# Patient Record
Sex: MALE | Race: WHITE | URBAN - NONMETROPOLITAN AREA
[De-identification: names, ages, dates, MRNs, and addresses within clinical notes are randomized per-mention and may not be internally consistent; named-entity substitution may affect disease eponyms.]

---

## 2017-01-09 ENCOUNTER — DOCTOR'S OFFICE (OUTPATIENT)
Dept: URBAN - NONMETROPOLITAN AREA CLINIC 1 | Facility: CLINIC | Age: 62
Setting detail: OPHTHALMOLOGY
End: 2017-01-09
Payer: MEDICARE

## 2017-01-09 DIAGNOSIS — H25.13: ICD-10-CM

## 2017-01-09 DIAGNOSIS — H16.223: ICD-10-CM

## 2017-01-09 DIAGNOSIS — H34.8310: ICD-10-CM

## 2017-01-09 PROCEDURE — 92134 CPTRZ OPH DX IMG PST SGM RTA: CPT | Performed by: OPHTHALMOLOGY

## 2017-01-09 PROCEDURE — 92014 COMPRE OPH EXAM EST PT 1/>: CPT | Performed by: OPHTHALMOLOGY

## 2017-01-09 ASSESSMENT — REFRACTION_MANIFEST
OD_VA1: 20/
OS_VA1: 20/
OS_VA2: 20/
OS_VA1: 20/
OS_VA2: 20/
OS_VA3: 20/
OD_VA2: 20/
OS_VA3: 20/
OU_VA: 20/
OD_VA2: 20/
OS_VA1: 20/
OD_VA3: 20/
OS_VA3: 20/
OU_VA: 20/
OD_VA2: 20/
OD_VA3: 20/
OD_VA3: 20/
OU_VA: 20/
OD_VA1: 20/
OD_VA1: 20/
OS_VA2: 20/

## 2017-01-09 ASSESSMENT — REFRACTION_CURRENTRX
OD_OVR_VA: 20/
OD_OVR_VA: 20/
OS_OVR_VA: 20/
OS_OVR_VA: 20/
OD_OVR_VA: 20/
OS_OVR_VA: 20/

## 2017-01-09 ASSESSMENT — VISUAL ACUITY
OS_BCVA: 20/25+2
OD_BCVA: 20/25+2

## 2017-01-09 ASSESSMENT — TEAR BREAK UP TIME (TBUT)
OS_TBUT: T
OD_TBUT: T

## 2017-01-09 ASSESSMENT — REFRACTION_AUTOREFRACTION
OS_SPHERE: +0.50
OD_AXIS: 100
OD_SPHERE: -0.25
OD_CYLINDER: -0.50
OS_AXIS: 67
OS_CYLINDER: -0.75

## 2017-01-09 ASSESSMENT — CONFRONTATIONAL VISUAL FIELD TEST (CVF)
OD_FINDINGS: FULL
OS_FINDINGS: FULL

## 2017-01-09 ASSESSMENT — LID EXAM ASSESSMENTS
OS_BLEPHARITIS: T
OD_BLEPHARITIS: T

## 2017-01-09 ASSESSMENT — SPHEQUIV_DERIVED
OD_SPHEQUIV: -0.5
OS_SPHEQUIV: 0.125

## 2017-01-16 ENCOUNTER — DOCTOR'S OFFICE (OUTPATIENT)
Dept: URBAN - NONMETROPOLITAN AREA CLINIC 1 | Facility: CLINIC | Age: 62
Setting detail: OPHTHALMOLOGY
End: 2017-01-16
Payer: MEDICARE

## 2017-01-16 DIAGNOSIS — H34.8310: ICD-10-CM

## 2017-01-16 PROCEDURE — 67028 INJECTION EYE DRUG: CPT | Performed by: OPHTHALMOLOGY

## 2017-01-16 ASSESSMENT — REFRACTION_AUTOREFRACTION
OS_SPHERE: +0.50
OS_CYLINDER: -0.75
OD_AXIS: 100
OD_SPHERE: -0.25
OD_CYLINDER: -0.50
OS_AXIS: 67

## 2017-01-16 ASSESSMENT — SPHEQUIV_DERIVED
OS_SPHEQUIV: 0.125
OD_SPHEQUIV: -0.5

## 2017-01-16 ASSESSMENT — VISUAL ACUITY
OS_BCVA: 20/25+2
OD_BCVA: 20/25+2

## 2017-01-23 ENCOUNTER — DOCTOR'S OFFICE (OUTPATIENT)
Dept: URBAN - NONMETROPOLITAN AREA CLINIC 1 | Facility: CLINIC | Age: 62
Setting detail: OPHTHALMOLOGY
End: 2017-01-23
Payer: MEDICARE

## 2017-01-23 DIAGNOSIS — H34.8310: ICD-10-CM

## 2017-01-23 PROCEDURE — 67210 TREATMENT OF RETINAL LESION: CPT | Performed by: OPHTHALMOLOGY

## 2017-01-23 ASSESSMENT — REFRACTION_AUTOREFRACTION
OS_CYLINDER: -0.75
OS_SPHERE: +0.50
OS_AXIS: 67
OD_AXIS: 100
OD_SPHERE: -0.25
OD_CYLINDER: -0.50

## 2017-01-23 ASSESSMENT — VISUAL ACUITY
OS_BCVA: 20/25+2
OD_BCVA: 20/25+2

## 2017-01-23 ASSESSMENT — SPHEQUIV_DERIVED
OS_SPHEQUIV: 0.125
OD_SPHEQUIV: -0.5

## 2017-02-20 ENCOUNTER — DOCTOR'S OFFICE (OUTPATIENT)
Dept: URBAN - NONMETROPOLITAN AREA CLINIC 1 | Facility: CLINIC | Age: 62
Setting detail: OPHTHALMOLOGY
End: 2017-02-20
Payer: MEDICARE

## 2017-02-20 DIAGNOSIS — H25.13: ICD-10-CM

## 2017-02-20 DIAGNOSIS — H34.8310: ICD-10-CM

## 2017-02-20 DIAGNOSIS — H16.223: ICD-10-CM

## 2017-02-20 PROCEDURE — 92134 CPTRZ OPH DX IMG PST SGM RTA: CPT | Performed by: OPHTHALMOLOGY

## 2017-02-20 PROCEDURE — 92235 FLUORESCEIN ANGRPH MLTIFRAME: CPT | Performed by: OPHTHALMOLOGY

## 2017-02-20 PROCEDURE — 99024 POSTOP FOLLOW-UP VISIT: CPT | Performed by: OPHTHALMOLOGY

## 2017-02-20 PROCEDURE — 92250 FUNDUS PHOTOGRAPHY W/I&R: CPT | Performed by: OPHTHALMOLOGY

## 2017-02-20 ASSESSMENT — REFRACTION_MANIFEST
OU_VA: 20/
OS_VA1: 20/
OU_VA: 20/
OD_VA1: 20/
OU_VA: 20/
OS_VA3: 20/
OS_VA2: 20/
OS_VA1: 20/
OD_VA2: 20/
OD_VA1: 20/
OD_VA2: 20/
OS_VA2: 20/
OD_VA1: 20/
OS_VA2: 20/
OD_VA2: 20/
OS_VA1: 20/
OD_VA3: 20/

## 2017-02-20 ASSESSMENT — TEAR BREAK UP TIME (TBUT)
OD_TBUT: T
OS_TBUT: T

## 2017-02-20 ASSESSMENT — REFRACTION_CURRENTRX
OD_OVR_VA: 20/
OS_OVR_VA: 20/
OS_OVR_VA: 20/
OD_OVR_VA: 20/
OS_OVR_VA: 20/
OD_OVR_VA: 20/

## 2017-02-20 ASSESSMENT — SPHEQUIV_DERIVED
OD_SPHEQUIV: -0.5
OS_SPHEQUIV: 0.125

## 2017-02-20 ASSESSMENT — LID EXAM ASSESSMENTS
OD_BLEPHARITIS: T
OS_BLEPHARITIS: T

## 2017-02-20 ASSESSMENT — REFRACTION_AUTOREFRACTION
OD_AXIS: 100
OD_SPHERE: -0.25
OS_SPHERE: +0.50
OD_CYLINDER: -0.50
OS_CYLINDER: -0.75
OS_AXIS: 67

## 2017-02-20 ASSESSMENT — VISUAL ACUITY
OD_BCVA: 20/20-1
OS_BCVA: 20/20-2

## 2017-02-20 ASSESSMENT — CONFRONTATIONAL VISUAL FIELD TEST (CVF)
OD_FINDINGS: FULL
OS_FINDINGS: FULL

## 2017-03-27 ENCOUNTER — DOCTOR'S OFFICE (OUTPATIENT)
Dept: URBAN - NONMETROPOLITAN AREA CLINIC 1 | Facility: CLINIC | Age: 62
Setting detail: OPHTHALMOLOGY
End: 2017-03-27
Payer: MEDICARE

## 2017-03-27 DIAGNOSIS — H16.223: ICD-10-CM

## 2017-03-27 DIAGNOSIS — H25.13: ICD-10-CM

## 2017-03-27 DIAGNOSIS — H34.8310: ICD-10-CM

## 2017-03-27 PROCEDURE — 92134 CPTRZ OPH DX IMG PST SGM RTA: CPT | Performed by: OPHTHALMOLOGY

## 2017-03-27 PROCEDURE — 99024 POSTOP FOLLOW-UP VISIT: CPT | Performed by: OPHTHALMOLOGY

## 2017-03-27 ASSESSMENT — REFRACTION_MANIFEST
OD_VA2: 20/
OD_VA3: 20/
OS_VA1: 20/
OD_VA3: 20/
OS_VA3: 20/
OS_VA2: 20/
OS_VA2: 20/
OD_VA1: 20/
OU_VA: 20/
OD_VA2: 20/
OS_VA1: 20/
OD_VA1: 20/
OS_VA3: 20/
OD_VA1: 20/
OS_VA3: 20/
OS_VA2: 20/
OS_VA1: 20/
OU_VA: 20/
OD_VA2: 20/
OU_VA: 20/
OD_VA3: 20/

## 2017-03-27 ASSESSMENT — REFRACTION_AUTOREFRACTION
OD_AXIS: 100
OD_SPHERE: -0.25
OS_SPHERE: +0.50
OS_CYLINDER: -0.75
OD_CYLINDER: -0.50
OS_AXIS: 67

## 2017-03-27 ASSESSMENT — SPHEQUIV_DERIVED
OS_SPHEQUIV: 0.125
OD_SPHEQUIV: -0.5

## 2017-03-27 ASSESSMENT — CONFRONTATIONAL VISUAL FIELD TEST (CVF)
OS_FINDINGS: FULL
OD_FINDINGS: FULL

## 2017-03-27 ASSESSMENT — LID EXAM ASSESSMENTS
OD_BLEPHARITIS: T
OS_BLEPHARITIS: T

## 2017-03-27 ASSESSMENT — REFRACTION_CURRENTRX
OD_OVR_VA: 20/
OS_OVR_VA: 20/
OD_OVR_VA: 20/
OS_OVR_VA: 20/
OS_OVR_VA: 20/
OD_OVR_VA: 20/

## 2017-03-27 ASSESSMENT — VISUAL ACUITY
OD_BCVA: 20/20
OS_BCVA: 20/20

## 2017-03-27 ASSESSMENT — TEAR BREAK UP TIME (TBUT)
OS_TBUT: T
OD_TBUT: T

## 2017-04-24 ENCOUNTER — DOCTOR'S OFFICE (OUTPATIENT)
Dept: URBAN - NONMETROPOLITAN AREA CLINIC 1 | Facility: CLINIC | Age: 62
Setting detail: OPHTHALMOLOGY
End: 2017-04-24
Payer: MEDICARE

## 2017-04-24 DIAGNOSIS — H43.811: ICD-10-CM

## 2017-04-24 DIAGNOSIS — H34.8310: ICD-10-CM

## 2017-04-24 DIAGNOSIS — H43.812: ICD-10-CM

## 2017-04-24 DIAGNOSIS — H25.13: ICD-10-CM

## 2017-04-24 DIAGNOSIS — H16.223: ICD-10-CM

## 2017-04-24 PROCEDURE — 92226 OPHTHALMOSCOPY EXT SUBSEQUENT: CPT | Performed by: OPHTHALMOLOGY

## 2017-04-24 PROCEDURE — 92014 COMPRE OPH EXAM EST PT 1/>: CPT | Performed by: OPHTHALMOLOGY

## 2017-04-24 PROCEDURE — 92134 CPTRZ OPH DX IMG PST SGM RTA: CPT | Performed by: OPHTHALMOLOGY

## 2017-04-24 ASSESSMENT — REFRACTION_AUTOREFRACTION
OS_CYLINDER: -0.75
OS_SPHERE: +0.50
OS_AXIS: 67
OD_CYLINDER: -0.50
OD_AXIS: 100
OD_SPHERE: -0.25

## 2017-04-24 ASSESSMENT — REFRACTION_CURRENTRX
OS_OVR_VA: 20/
OD_OVR_VA: 20/
OD_OVR_VA: 20/
OS_OVR_VA: 20/
OD_OVR_VA: 20/
OS_OVR_VA: 20/

## 2017-04-24 ASSESSMENT — SPHEQUIV_DERIVED
OS_SPHEQUIV: 0.125
OD_SPHEQUIV: -0.5

## 2017-04-24 ASSESSMENT — REFRACTION_MANIFEST
OS_VA3: 20/
OU_VA: 20/
OU_VA: 20/
OD_VA1: 20/
OD_VA2: 20/
OS_VA2: 20/
OS_VA2: 20/
OS_VA3: 20/
OD_VA2: 20/
OD_VA2: 20/
OD_VA3: 20/
OS_VA2: 20/
OD_VA1: 20/
OD_VA1: 20/
OS_VA1: 20/
OS_VA1: 20/
OS_VA3: 20/
OD_VA3: 20/
OS_VA1: 20/
OU_VA: 20/
OD_VA3: 20/

## 2017-04-24 ASSESSMENT — TEAR BREAK UP TIME (TBUT)
OD_TBUT: T
OS_TBUT: T

## 2017-04-24 ASSESSMENT — LID EXAM ASSESSMENTS
OD_BLEPHARITIS: T
OS_BLEPHARITIS: T

## 2017-04-24 ASSESSMENT — CONFRONTATIONAL VISUAL FIELD TEST (CVF)
OD_FINDINGS: FULL
OS_FINDINGS: FULL

## 2017-04-24 ASSESSMENT — VISUAL ACUITY
OS_BCVA: 20/20-1
OD_BCVA: 20/20-1

## 2017-05-15 ENCOUNTER — DOCTOR'S OFFICE (OUTPATIENT)
Dept: URBAN - NONMETROPOLITAN AREA CLINIC 1 | Facility: CLINIC | Age: 62
Setting detail: OPHTHALMOLOGY
End: 2017-05-15
Payer: MEDICARE

## 2017-05-15 DIAGNOSIS — H34.8310: ICD-10-CM

## 2017-05-15 PROCEDURE — 67028 INJECTION EYE DRUG: CPT | Performed by: OPHTHALMOLOGY

## 2017-05-15 ASSESSMENT — REFRACTION_AUTOREFRACTION
OD_CYLINDER: -0.50
OS_AXIS: 67
OS_SPHERE: +0.50
OD_SPHERE: -0.25
OS_CYLINDER: -0.75
OD_AXIS: 100

## 2017-05-15 ASSESSMENT — VISUAL ACUITY
OD_BCVA: 20/20-1
OS_BCVA: 20/20-1

## 2017-05-15 ASSESSMENT — SPHEQUIV_DERIVED
OD_SPHEQUIV: -0.5
OS_SPHEQUIV: 0.125

## 2017-06-19 ENCOUNTER — DOCTOR'S OFFICE (OUTPATIENT)
Dept: URBAN - NONMETROPOLITAN AREA CLINIC 1 | Facility: CLINIC | Age: 62
Setting detail: OPHTHALMOLOGY
End: 2017-06-19
Payer: MEDICARE

## 2017-06-19 DIAGNOSIS — H25.13: ICD-10-CM

## 2017-06-19 DIAGNOSIS — H34.8310: ICD-10-CM

## 2017-06-19 DIAGNOSIS — H16.223: ICD-10-CM

## 2017-06-19 DIAGNOSIS — H43.812: ICD-10-CM

## 2017-06-19 DIAGNOSIS — H43.811: ICD-10-CM

## 2017-06-19 PROCEDURE — 92134 CPTRZ OPH DX IMG PST SGM RTA: CPT | Performed by: OPHTHALMOLOGY

## 2017-06-19 PROCEDURE — 92014 COMPRE OPH EXAM EST PT 1/>: CPT | Performed by: OPHTHALMOLOGY

## 2017-06-19 PROCEDURE — 92226 OPHTHALMOSCOPY EXT SUBSEQUENT: CPT | Performed by: OPHTHALMOLOGY

## 2017-06-19 ASSESSMENT — REFRACTION_CURRENTRX
OD_OVR_VA: 20/
OS_OVR_VA: 20/

## 2017-06-19 ASSESSMENT — REFRACTION_AUTOREFRACTION
OD_AXIS: 100
OD_CYLINDER: -0.50
OS_SPHERE: +0.50
OS_AXIS: 67
OS_CYLINDER: -0.75
OD_SPHERE: -0.25

## 2017-06-19 ASSESSMENT — REFRACTION_MANIFEST
OS_VA3: 20/
OD_VA3: 20/
OU_VA: 20/
OS_VA2: 20/
OD_VA2: 20/
OS_VA3: 20/
OS_VA3: 20/
OD_VA1: 20/
OD_VA3: 20/
OD_VA1: 20/
OS_VA1: 20/
OU_VA: 20/
OD_VA3: 20/
OU_VA: 20/
OD_VA1: 20/
OD_VA2: 20/
OS_VA2: 20/
OS_VA2: 20/
OD_VA2: 20/
OS_VA1: 20/
OS_VA1: 20/

## 2017-06-19 ASSESSMENT — CONFRONTATIONAL VISUAL FIELD TEST (CVF)
OS_FINDINGS: FULL
OD_FINDINGS: FULL

## 2017-06-19 ASSESSMENT — TEAR BREAK UP TIME (TBUT)
OS_TBUT: T
OD_TBUT: T

## 2017-06-19 ASSESSMENT — LID EXAM ASSESSMENTS
OS_BLEPHARITIS: T
OD_BLEPHARITIS: T

## 2017-06-19 ASSESSMENT — VISUAL ACUITY
OS_BCVA: 20/20-2
OD_BCVA: 20/20-1

## 2017-06-19 ASSESSMENT — SPHEQUIV_DERIVED
OS_SPHEQUIV: 0.125
OD_SPHEQUIV: -0.5

## 2017-07-24 ENCOUNTER — DOCTOR'S OFFICE (OUTPATIENT)
Dept: URBAN - NONMETROPOLITAN AREA CLINIC 1 | Facility: CLINIC | Age: 62
Setting detail: OPHTHALMOLOGY
End: 2017-07-24

## 2017-07-24 ENCOUNTER — DOCTOR'S OFFICE (OUTPATIENT)
Dept: URBAN - NONMETROPOLITAN AREA CLINIC 1 | Facility: CLINIC | Age: 62
Setting detail: OPHTHALMOLOGY
End: 2017-07-24
Payer: MEDICARE

## 2017-07-24 ENCOUNTER — RX ONLY (RX ONLY)
Age: 62
End: 2017-07-24

## 2017-07-24 DIAGNOSIS — H25.13: ICD-10-CM

## 2017-07-24 DIAGNOSIS — H34.8310: ICD-10-CM

## 2017-07-24 DIAGNOSIS — H16.223: ICD-10-CM

## 2017-07-24 DIAGNOSIS — H43.811: ICD-10-CM

## 2017-07-24 DIAGNOSIS — H52.4: ICD-10-CM

## 2017-07-24 DIAGNOSIS — H43.812: ICD-10-CM

## 2017-07-24 PROCEDURE — 92014 COMPRE OPH EXAM EST PT 1/>: CPT | Performed by: OPHTHALMOLOGY

## 2017-07-24 PROCEDURE — 92134 CPTRZ OPH DX IMG PST SGM RTA: CPT | Performed by: OPHTHALMOLOGY

## 2017-07-24 PROCEDURE — VITAEYE VITA EYE VITAMINS: Performed by: OPHTHALMOLOGY

## 2017-07-24 ASSESSMENT — VISUAL ACUITY
OS_BCVA: 20/20
OD_BCVA: 20/20

## 2017-07-24 ASSESSMENT — REFRACTION_AUTOREFRACTION
OS_CYLINDER: -0.75
OD_AXIS: 100
OD_SPHERE: -0.25
OS_AXIS: 67
OS_SPHERE: +0.50
OD_CYLINDER: -0.50

## 2017-07-24 ASSESSMENT — LID EXAM ASSESSMENTS
OD_BLEPHARITIS: T
OS_BLEPHARITIS: T

## 2017-07-24 ASSESSMENT — REFRACTION_CURRENTRX
OS_OVR_VA: 20/
OD_OVR_VA: 20/
OS_OVR_VA: 20/
OS_OVR_VA: 20/

## 2017-07-24 ASSESSMENT — REFRACTION_MANIFEST
OS_VA2: 20/
OS_VA1: 20/
OD_VA2: 20/
OD_VA3: 20/
OD_VA1: 20/
OD_VA2: 20/
OU_VA: 20/
OS_VA2: 20/
OS_VA1: 20/
OS_VA2: 20/
OS_VA3: 20/
OS_VA3: 20/
OD_VA1: 20/
OS_VA3: 20/
OD_VA1: 20/
OD_VA2: 20/
OU_VA: 20/
OD_VA3: 20/
OS_VA1: 20/
OU_VA: 20/
OD_VA3: 20/

## 2017-07-24 ASSESSMENT — SPHEQUIV_DERIVED
OD_SPHEQUIV: -0.5
OS_SPHEQUIV: 0.125

## 2017-07-24 ASSESSMENT — TEAR BREAK UP TIME (TBUT)
OD_TBUT: T
OS_TBUT: T

## 2017-07-24 ASSESSMENT — CONFRONTATIONAL VISUAL FIELD TEST (CVF)
OS_FINDINGS: FULL
OD_FINDINGS: FULL

## 2017-07-31 ENCOUNTER — DOCTOR'S OFFICE (OUTPATIENT)
Dept: URBAN - NONMETROPOLITAN AREA CLINIC 1 | Facility: CLINIC | Age: 62
Setting detail: OPHTHALMOLOGY
End: 2017-07-31
Payer: MEDICARE

## 2017-07-31 DIAGNOSIS — H43.813: ICD-10-CM

## 2017-07-31 DIAGNOSIS — H25.13: ICD-10-CM

## 2017-07-31 DIAGNOSIS — H34.8310: ICD-10-CM

## 2017-07-31 DIAGNOSIS — H16.223: ICD-10-CM

## 2017-07-31 PROCEDURE — 67028 INJECTION EYE DRUG: CPT | Performed by: OPHTHALMOLOGY

## 2017-07-31 PROCEDURE — 92235 FLUORESCEIN ANGRPH MLTIFRAME: CPT | Performed by: OPHTHALMOLOGY

## 2017-07-31 PROCEDURE — 92250 FUNDUS PHOTOGRAPHY W/I&R: CPT | Performed by: OPHTHALMOLOGY

## 2017-07-31 PROCEDURE — 92014 COMPRE OPH EXAM EST PT 1/>: CPT | Performed by: OPHTHALMOLOGY

## 2017-07-31 ASSESSMENT — REFRACTION_MANIFEST
OD_VA3: 20/
OD_VA2: 20/
OU_VA: 20/
OD_VA1: 20/
OS_VA1: 20/
OS_VA3: 20/
OS_VA1: 20/
OS_VA3: 20/
OS_VA2: 20/
OD_VA1: 20/
OS_VA2: 20/
OD_VA3: 20/
OU_VA: 20/
OU_VA: 20/
OS_VA1: 20/
OD_VA2: 20/
OS_VA2: 20/
OD_VA3: 20/
OD_VA1: 20/
OS_VA3: 20/
OD_VA2: 20/

## 2017-07-31 ASSESSMENT — REFRACTION_AUTOREFRACTION
OD_CYLINDER: -0.50
OS_CYLINDER: -0.75
OD_SPHERE: -0.25
OS_AXIS: 67
OS_SPHERE: +0.50
OD_AXIS: 100

## 2017-07-31 ASSESSMENT — REFRACTION_CURRENTRX
OS_OVR_VA: 20/
OS_OVR_VA: 20/
OD_OVR_VA: 20/
OS_OVR_VA: 20/

## 2017-07-31 ASSESSMENT — CONFRONTATIONAL VISUAL FIELD TEST (CVF)
OS_FINDINGS: FULL
OD_FINDINGS: FULL

## 2017-07-31 ASSESSMENT — VISUAL ACUITY
OS_BCVA: 20/25
OD_BCVA: 20/20

## 2017-07-31 ASSESSMENT — TEAR BREAK UP TIME (TBUT)
OS_TBUT: T
OD_TBUT: T

## 2017-07-31 ASSESSMENT — LID EXAM ASSESSMENTS
OD_BLEPHARITIS: T
OS_BLEPHARITIS: T

## 2017-07-31 ASSESSMENT — SPHEQUIV_DERIVED
OD_SPHEQUIV: -0.5
OS_SPHEQUIV: 0.125

## 2017-09-11 ENCOUNTER — DOCTOR'S OFFICE (OUTPATIENT)
Dept: URBAN - NONMETROPOLITAN AREA CLINIC 1 | Facility: CLINIC | Age: 62
Setting detail: OPHTHALMOLOGY
End: 2017-09-11
Payer: MEDICARE

## 2017-09-11 DIAGNOSIS — H43.812: ICD-10-CM

## 2017-09-11 DIAGNOSIS — H43.811: ICD-10-CM

## 2017-09-11 DIAGNOSIS — H16.223: ICD-10-CM

## 2017-09-11 DIAGNOSIS — H43.813: ICD-10-CM

## 2017-09-11 DIAGNOSIS — H34.8310: ICD-10-CM

## 2017-09-11 DIAGNOSIS — H25.13: ICD-10-CM

## 2017-09-11 PROCEDURE — 92226 OPHTHALMOSCOPY EXT SUBSEQUENT: CPT | Performed by: OPHTHALMOLOGY

## 2017-09-11 PROCEDURE — 92014 COMPRE OPH EXAM EST PT 1/>: CPT | Performed by: OPHTHALMOLOGY

## 2017-09-11 PROCEDURE — 92134 CPTRZ OPH DX IMG PST SGM RTA: CPT | Performed by: OPHTHALMOLOGY

## 2017-09-11 ASSESSMENT — REFRACTION_MANIFEST
OU_VA: 20/
OD_VA1: 20/
OS_VA2: 20/
OS_VA1: 20/
OS_VA3: 20/
OS_VA1: 20/
OD_VA1: 20/
OD_VA2: 20/
OD_VA3: 20/
OS_VA1: 20/
OS_VA2: 20/
OD_VA1: 20/
OD_VA3: 20/
OS_VA2: 20/
OD_VA2: 20/
OD_VA3: 20/
OS_VA3: 20/
OD_VA2: 20/
OU_VA: 20/
OU_VA: 20/
OS_VA3: 20/

## 2017-09-11 ASSESSMENT — SPHEQUIV_DERIVED
OD_SPHEQUIV: -0.5
OS_SPHEQUIV: 0.125

## 2017-09-11 ASSESSMENT — REFRACTION_AUTOREFRACTION
OD_AXIS: 100
OS_CYLINDER: -0.75
OD_CYLINDER: -0.50
OS_SPHERE: +0.50
OD_SPHERE: -0.25
OS_AXIS: 67

## 2017-09-11 ASSESSMENT — REFRACTION_CURRENTRX
OS_OVR_VA: 20/
OD_OVR_VA: 20/
OD_OVR_VA: 20/
OS_OVR_VA: 20/
OS_OVR_VA: 20/
OD_OVR_VA: 20/

## 2017-09-11 ASSESSMENT — TEAR BREAK UP TIME (TBUT)
OS_TBUT: T
OD_TBUT: T

## 2017-09-11 ASSESSMENT — CONFRONTATIONAL VISUAL FIELD TEST (CVF)
OD_FINDINGS: FULL
OS_FINDINGS: FULL

## 2017-09-11 ASSESSMENT — LID EXAM ASSESSMENTS
OS_BLEPHARITIS: T
OD_BLEPHARITIS: T

## 2017-09-11 ASSESSMENT — VISUAL ACUITY
OD_BCVA: 20/20-1
OS_BCVA: 20/20

## 2017-10-16 ENCOUNTER — DOCTOR'S OFFICE (OUTPATIENT)
Dept: URBAN - NONMETROPOLITAN AREA CLINIC 1 | Facility: CLINIC | Age: 62
Setting detail: OPHTHALMOLOGY
End: 2017-10-16
Payer: MEDICARE

## 2017-10-16 ENCOUNTER — DOCTOR'S OFFICE (OUTPATIENT)
Dept: URBAN - METROPOLITAN AREA CLINIC 136 | Facility: CLINIC | Age: 62
Setting detail: OPHTHALMOLOGY
End: 2017-10-16

## 2017-10-16 DIAGNOSIS — H43.813: ICD-10-CM

## 2017-10-16 DIAGNOSIS — H34.8310: ICD-10-CM

## 2017-10-16 DIAGNOSIS — H43.812: ICD-10-CM

## 2017-10-16 DIAGNOSIS — H25.13: ICD-10-CM

## 2017-10-16 DIAGNOSIS — H52.4: ICD-10-CM

## 2017-10-16 DIAGNOSIS — H16.223: ICD-10-CM

## 2017-10-16 DIAGNOSIS — H43.811: ICD-10-CM

## 2017-10-16 PROCEDURE — 92134 CPTRZ OPH DX IMG PST SGM RTA: CPT | Performed by: OPHTHALMOLOGY

## 2017-10-16 PROCEDURE — VITAEYE VITA EYE VITAMINS: Performed by: OPHTHALMOLOGY

## 2017-10-16 PROCEDURE — 92014 COMPRE OPH EXAM EST PT 1/>: CPT | Performed by: OPHTHALMOLOGY

## 2017-10-16 PROCEDURE — 92226 OPHTHALMOSCOPY EXT SUBSEQUENT: CPT | Performed by: OPHTHALMOLOGY

## 2017-10-16 ASSESSMENT — REFRACTION_AUTOREFRACTION
OD_SPHERE: -0.25
OS_SPHERE: +0.50
OD_AXIS: 100
OD_CYLINDER: -0.50
OS_CYLINDER: -0.75
OS_AXIS: 67

## 2017-10-16 ASSESSMENT — REFRACTION_MANIFEST
OS_VA1: 20/
OD_VA1: 20/
OU_VA: 20/
OD_VA1: 20/
OS_VA1: 20/
OS_VA2: 20/
OS_VA2: 20/
OD_VA3: 20/
OD_VA3: 20/
OD_VA1: 20/
OU_VA: 20/
OS_VA3: 20/
OS_VA3: 20/
OS_VA2: 20/
OU_VA: 20/
OS_VA1: 20/
OD_VA2: 20/
OS_VA3: 20/
OD_VA2: 20/
OD_VA2: 20/
OD_VA3: 20/

## 2017-10-16 ASSESSMENT — REFRACTION_CURRENTRX
OD_OVR_VA: 20/
OS_OVR_VA: 20/

## 2017-10-16 ASSESSMENT — CONFRONTATIONAL VISUAL FIELD TEST (CVF)
OD_FINDINGS: FULL
OS_FINDINGS: FULL

## 2017-10-16 ASSESSMENT — LID EXAM ASSESSMENTS
OD_BLEPHARITIS: T
OS_BLEPHARITIS: T

## 2017-10-16 ASSESSMENT — SPHEQUIV_DERIVED
OD_SPHEQUIV: -0.5
OS_SPHEQUIV: 0.125

## 2017-10-16 ASSESSMENT — VISUAL ACUITY
OD_BCVA: 20/20-1
OS_BCVA: 20/20-1

## 2017-10-16 ASSESSMENT — TEAR BREAK UP TIME (TBUT)
OD_TBUT: T
OS_TBUT: T

## 2017-11-13 ENCOUNTER — DOCTOR'S OFFICE (OUTPATIENT)
Dept: URBAN - NONMETROPOLITAN AREA CLINIC 1 | Facility: CLINIC | Age: 62
Setting detail: OPHTHALMOLOGY
End: 2017-11-13
Payer: MEDICARE

## 2017-11-13 DIAGNOSIS — H16.223: ICD-10-CM

## 2017-11-13 DIAGNOSIS — H43.813: ICD-10-CM

## 2017-11-13 DIAGNOSIS — H25.13: ICD-10-CM

## 2017-11-13 DIAGNOSIS — H34.8310: ICD-10-CM

## 2017-11-13 PROCEDURE — 67028 INJECTION EYE DRUG: CPT | Performed by: OPHTHALMOLOGY

## 2017-11-13 PROCEDURE — 92250 FUNDUS PHOTOGRAPHY W/I&R: CPT | Performed by: OPHTHALMOLOGY

## 2017-11-13 PROCEDURE — 92235 FLUORESCEIN ANGRPH MLTIFRAME: CPT | Performed by: OPHTHALMOLOGY

## 2017-11-13 ASSESSMENT — REFRACTION_MANIFEST
OS_VA1: 20/
OU_VA: 20/
OS_VA3: 20/
OD_VA1: 20/
OS_VA2: 20/
OS_VA2: 20/
OS_VA3: 20/
OD_VA2: 20/
OS_VA2: 20/
OD_VA3: 20/
OD_VA2: 20/
OS_VA3: 20/
OD_VA1: 20/
OD_VA3: 20/
OS_VA1: 20/
OD_VA2: 20/
OD_VA3: 20/
OD_VA1: 20/
OS_VA1: 20/
OU_VA: 20/
OU_VA: 20/

## 2017-11-13 ASSESSMENT — REFRACTION_CURRENTRX
OS_OVR_VA: 20/
OD_OVR_VA: 20/
OS_OVR_VA: 20/
OD_OVR_VA: 20/
OS_OVR_VA: 20/
OD_OVR_VA: 20/

## 2017-11-13 ASSESSMENT — REFRACTION_AUTOREFRACTION
OD_AXIS: 100
OS_CYLINDER: -0.75
OS_AXIS: 67
OD_CYLINDER: -0.50
OD_SPHERE: -0.25
OS_SPHERE: +0.50

## 2017-11-13 ASSESSMENT — VISUAL ACUITY
OD_BCVA: 20/20-1
OS_BCVA: 20/20-1

## 2017-11-13 ASSESSMENT — CONFRONTATIONAL VISUAL FIELD TEST (CVF)
OS_FINDINGS: FULL
OD_FINDINGS: FULL

## 2017-11-13 ASSESSMENT — SPHEQUIV_DERIVED
OD_SPHEQUIV: -0.5
OS_SPHEQUIV: 0.125

## 2017-12-18 ENCOUNTER — DOCTOR'S OFFICE (OUTPATIENT)
Dept: URBAN - NONMETROPOLITAN AREA CLINIC 1 | Facility: CLINIC | Age: 62
Setting detail: OPHTHALMOLOGY
End: 2017-12-18
Payer: MEDICARE

## 2017-12-18 DIAGNOSIS — H43.813: ICD-10-CM

## 2017-12-18 DIAGNOSIS — H16.223: ICD-10-CM

## 2017-12-18 DIAGNOSIS — H43.811: ICD-10-CM

## 2017-12-18 DIAGNOSIS — H34.8310: ICD-10-CM

## 2017-12-18 DIAGNOSIS — H43.812: ICD-10-CM

## 2017-12-18 DIAGNOSIS — H25.13: ICD-10-CM

## 2017-12-18 PROCEDURE — 92226 OPHTHALMOSCOPY EXT SUBSEQUENT: CPT | Performed by: OPHTHALMOLOGY

## 2017-12-18 PROCEDURE — 92014 COMPRE OPH EXAM EST PT 1/>: CPT | Performed by: OPHTHALMOLOGY

## 2017-12-18 PROCEDURE — 92134 CPTRZ OPH DX IMG PST SGM RTA: CPT | Performed by: OPHTHALMOLOGY

## 2017-12-18 ASSESSMENT — REFRACTION_MANIFEST
OD_VA3: 20/
OD_VA1: 20/
OS_VA1: 20/
OU_VA: 20/
OD_VA3: 20/
OS_VA3: 20/
OD_VA1: 20/
OS_VA2: 20/
OS_VA1: 20/
OD_VA1: 20/
OS_VA1: 20/
OU_VA: 20/
OS_VA2: 20/
OS_VA3: 20/
OD_VA2: 20/
OD_VA3: 20/
OS_VA3: 20/
OU_VA: 20/
OD_VA2: 20/
OD_VA2: 20/
OS_VA2: 20/

## 2017-12-18 ASSESSMENT — REFRACTION_AUTOREFRACTION
OD_AXIS: 100
OD_SPHERE: -0.25
OS_AXIS: 67
OS_SPHERE: +0.50
OD_CYLINDER: -0.50
OS_CYLINDER: -0.75

## 2017-12-18 ASSESSMENT — REFRACTION_CURRENTRX
OD_OVR_VA: 20/
OS_OVR_VA: 20/
OD_OVR_VA: 20/
OS_OVR_VA: 20/
OS_OVR_VA: 20/
OD_OVR_VA: 20/

## 2017-12-18 ASSESSMENT — LID EXAM ASSESSMENTS
OD_BLEPHARITIS: T
OS_BLEPHARITIS: T

## 2017-12-18 ASSESSMENT — SPHEQUIV_DERIVED
OD_SPHEQUIV: -0.5
OS_SPHEQUIV: 0.125

## 2017-12-18 ASSESSMENT — TEAR BREAK UP TIME (TBUT)
OD_TBUT: T
OS_TBUT: T

## 2017-12-18 ASSESSMENT — VISUAL ACUITY
OS_BCVA: 20/20-2
OD_BCVA: 20/20-1

## 2017-12-18 ASSESSMENT — CONFRONTATIONAL VISUAL FIELD TEST (CVF)
OS_FINDINGS: FULL
OD_FINDINGS: FULL

## 2018-01-29 ENCOUNTER — DOCTOR'S OFFICE (OUTPATIENT)
Dept: URBAN - NONMETROPOLITAN AREA CLINIC 1 | Facility: CLINIC | Age: 63
Setting detail: OPHTHALMOLOGY
End: 2018-01-29

## 2018-01-29 ENCOUNTER — RX ONLY (RX ONLY)
Age: 63
End: 2018-01-29

## 2018-01-29 ENCOUNTER — DOCTOR'S OFFICE (OUTPATIENT)
Dept: URBAN - NONMETROPOLITAN AREA CLINIC 1 | Facility: CLINIC | Age: 63
Setting detail: OPHTHALMOLOGY
End: 2018-01-29
Payer: MEDICARE

## 2018-01-29 DIAGNOSIS — H16.223: ICD-10-CM

## 2018-01-29 DIAGNOSIS — H04.123: ICD-10-CM

## 2018-01-29 DIAGNOSIS — H34.8310: ICD-10-CM

## 2018-01-29 DIAGNOSIS — H52.7: ICD-10-CM

## 2018-01-29 DIAGNOSIS — H43.811: ICD-10-CM

## 2018-01-29 DIAGNOSIS — H43.812: ICD-10-CM

## 2018-01-29 DIAGNOSIS — H25.13: ICD-10-CM

## 2018-01-29 PROCEDURE — 92014 COMPRE OPH EXAM EST PT 1/>: CPT | Performed by: OPHTHALMOLOGY

## 2018-01-29 PROCEDURE — 92134 CPTRZ OPH DX IMG PST SGM RTA: CPT | Performed by: OPHTHALMOLOGY

## 2018-01-29 PROCEDURE — 92226 OPHTHALMOSCOPY EXT SUBSEQUENT: CPT | Performed by: OPHTHALMOLOGY

## 2018-01-29 PROCEDURE — 83861 MICROFLUID ANALY TEARS: CPT | Performed by: OPHTHALMOLOGY

## 2018-01-29 PROCEDURE — VITAEYE VITA EYE VITAMINS: Performed by: OPHTHALMOLOGY

## 2018-01-29 ASSESSMENT — REFRACTION_MANIFEST
OS_VA2: 20/
OD_VA2: 20/
OD_VA3: 20/
OS_VA1: 20/
OU_VA: 20/
OD_VA1: 20/
OS_VA1: 20/
OU_VA: 20/
OS_VA2: 20/
OS_VA1: 20/
OD_VA1: 20/
OS_VA2: 20/
OD_VA3: 20/
OD_VA3: 20/
OD_VA2: 20/
OS_VA3: 20/
OD_VA2: 20/
OS_VA3: 20/
OS_VA3: 20/
OU_VA: 20/
OD_VA1: 20/

## 2018-01-29 ASSESSMENT — VISUAL ACUITY
OD_BCVA: 20/20-2
OS_BCVA: 20/20-2

## 2018-01-29 ASSESSMENT — TEAR BREAK UP TIME (TBUT)
OD_TBUT: T
OS_TBUT: T

## 2018-01-29 ASSESSMENT — REFRACTION_AUTOREFRACTION
OD_SPHERE: -0.25
OD_AXIS: 100
OS_AXIS: 67
OS_SPHERE: +0.50
OS_CYLINDER: -0.75
OD_CYLINDER: -0.50

## 2018-01-29 ASSESSMENT — REFRACTION_CURRENTRX
OD_OVR_VA: 20/
OD_OVR_VA: 20/
OS_OVR_VA: 20/
OS_OVR_VA: 20/
OD_OVR_VA: 20/
OS_OVR_VA: 20/

## 2018-01-29 ASSESSMENT — LID EXAM ASSESSMENTS
OS_BLEPHARITIS: T
OD_BLEPHARITIS: T

## 2018-01-29 ASSESSMENT — SPHEQUIV_DERIVED
OD_SPHEQUIV: -0.5
OS_SPHEQUIV: 0.125

## 2018-01-29 ASSESSMENT — CONFRONTATIONAL VISUAL FIELD TEST (CVF)
OS_FINDINGS: FULL
OD_FINDINGS: FULL

## 2018-02-05 ENCOUNTER — DOCTOR'S OFFICE (OUTPATIENT)
Dept: URBAN - NONMETROPOLITAN AREA CLINIC 1 | Facility: CLINIC | Age: 63
Setting detail: OPHTHALMOLOGY
End: 2018-02-05
Payer: MEDICARE

## 2018-02-05 DIAGNOSIS — H34.8310: ICD-10-CM

## 2018-02-05 PROCEDURE — 67028 INJECTION EYE DRUG: CPT | Performed by: OPHTHALMOLOGY

## 2018-02-05 ASSESSMENT — REFRACTION_AUTOREFRACTION
OS_SPHERE: +0.50
OD_AXIS: 100
OS_CYLINDER: -0.75
OD_CYLINDER: -0.50
OD_SPHERE: -0.25
OS_AXIS: 67

## 2018-02-05 ASSESSMENT — VISUAL ACUITY
OS_BCVA: 20/20-2
OD_BCVA: 20/20-2

## 2018-02-05 ASSESSMENT — SPHEQUIV_DERIVED
OS_SPHEQUIV: 0.125
OD_SPHEQUIV: -0.5

## 2018-03-12 ENCOUNTER — DOCTOR'S OFFICE (OUTPATIENT)
Dept: URBAN - NONMETROPOLITAN AREA CLINIC 1 | Facility: CLINIC | Age: 63
Setting detail: OPHTHALMOLOGY
End: 2018-03-12
Payer: MEDICARE

## 2018-03-12 DIAGNOSIS — H25.13: ICD-10-CM

## 2018-03-12 DIAGNOSIS — H16.223: ICD-10-CM

## 2018-03-12 DIAGNOSIS — H34.8310: ICD-10-CM

## 2018-03-12 DIAGNOSIS — H43.812: ICD-10-CM

## 2018-03-12 DIAGNOSIS — H04.123: ICD-10-CM

## 2018-03-12 DIAGNOSIS — H43.811: ICD-10-CM

## 2018-03-12 DIAGNOSIS — H43.813: ICD-10-CM

## 2018-03-12 PROCEDURE — 92134 CPTRZ OPH DX IMG PST SGM RTA: CPT | Performed by: OPHTHALMOLOGY

## 2018-03-12 PROCEDURE — 92014 COMPRE OPH EXAM EST PT 1/>: CPT | Performed by: OPHTHALMOLOGY

## 2018-03-12 PROCEDURE — 92226 OPHTHALMOSCOPY EXT SUBSEQUENT: CPT | Performed by: OPHTHALMOLOGY

## 2018-03-12 ASSESSMENT — REFRACTION_CURRENTRX
OD_OVR_VA: 20/
OS_OVR_VA: 20/

## 2018-03-12 ASSESSMENT — REFRACTION_MANIFEST
OS_VA3: 20/
OS_VA3: 20/
OD_VA2: 20/
OD_VA1: 20/
OD_VA3: 20/
OS_VA3: 20/
OS_VA1: 20/
OU_VA: 20/
OS_VA2: 20/
OU_VA: 20/
OS_VA2: 20/
OS_VA1: 20/
OD_VA2: 20/
OU_VA: 20/
OS_VA1: 20/
OD_VA3: 20/
OD_VA2: 20/
OD_VA1: 20/
OD_VA1: 20/
OS_VA2: 20/
OD_VA3: 20/

## 2018-03-12 ASSESSMENT — VISUAL ACUITY
OS_BCVA: 20/20-2
OD_BCVA: 20/30

## 2018-03-12 ASSESSMENT — LID EXAM ASSESSMENTS
OS_BLEPHARITIS: T
OD_BLEPHARITIS: T

## 2018-03-12 ASSESSMENT — REFRACTION_AUTOREFRACTION
OS_AXIS: 67
OS_SPHERE: +0.50
OD_CYLINDER: -0.50
OS_CYLINDER: -0.75
OD_SPHERE: -0.25
OD_AXIS: 100

## 2018-03-12 ASSESSMENT — CONFRONTATIONAL VISUAL FIELD TEST (CVF)
OS_FINDINGS: FULL
OD_FINDINGS: FULL

## 2018-03-12 ASSESSMENT — SPHEQUIV_DERIVED
OD_SPHEQUIV: -0.5
OS_SPHEQUIV: 0.125

## 2018-03-12 ASSESSMENT — TEAR BREAK UP TIME (TBUT)
OS_TBUT: T
OD_TBUT: T

## 2018-04-02 ENCOUNTER — DOCTOR'S OFFICE (OUTPATIENT)
Dept: URBAN - NONMETROPOLITAN AREA CLINIC 1 | Facility: CLINIC | Age: 63
Setting detail: OPHTHALMOLOGY
End: 2018-04-02
Payer: MEDICARE

## 2018-04-02 ENCOUNTER — RX ONLY (RX ONLY)
Age: 63
End: 2018-04-02

## 2018-04-02 DIAGNOSIS — H34.8310: ICD-10-CM

## 2018-04-02 PROCEDURE — 67028 INJECTION EYE DRUG: CPT | Performed by: OPHTHALMOLOGY

## 2018-04-02 ASSESSMENT — REFRACTION_AUTOREFRACTION
OD_SPHERE: -0.25
OS_CYLINDER: -0.75
OS_AXIS: 67
OD_CYLINDER: -0.50
OS_SPHERE: +0.50
OD_AXIS: 100

## 2018-04-02 ASSESSMENT — VISUAL ACUITY
OS_BCVA: 20/20-2
OD_BCVA: 20/30

## 2018-04-02 ASSESSMENT — SPHEQUIV_DERIVED
OD_SPHEQUIV: -0.5
OS_SPHEQUIV: 0.125

## 2018-04-23 ENCOUNTER — DOCTOR'S OFFICE (OUTPATIENT)
Dept: URBAN - NONMETROPOLITAN AREA CLINIC 1 | Facility: CLINIC | Age: 63
Setting detail: OPHTHALMOLOGY
End: 2018-04-23
Payer: MEDICARE

## 2018-04-23 ENCOUNTER — DOCTOR'S OFFICE (OUTPATIENT)
Dept: URBAN - NONMETROPOLITAN AREA CLINIC 1 | Facility: CLINIC | Age: 63
Setting detail: OPHTHALMOLOGY
End: 2018-04-23

## 2018-04-23 DIAGNOSIS — H52.7: ICD-10-CM

## 2018-04-23 DIAGNOSIS — H34.8310: ICD-10-CM

## 2018-04-23 DIAGNOSIS — H43.811: ICD-10-CM

## 2018-04-23 DIAGNOSIS — H43.812: ICD-10-CM

## 2018-04-23 DIAGNOSIS — H04.123: ICD-10-CM

## 2018-04-23 DIAGNOSIS — H25.13: ICD-10-CM

## 2018-04-23 DIAGNOSIS — H16.223: ICD-10-CM

## 2018-04-23 DIAGNOSIS — H43.813: ICD-10-CM

## 2018-04-23 PROCEDURE — 92014 COMPRE OPH EXAM EST PT 1/>: CPT | Performed by: OPHTHALMOLOGY

## 2018-04-23 PROCEDURE — 92134 CPTRZ OPH DX IMG PST SGM RTA: CPT | Performed by: OPHTHALMOLOGY

## 2018-04-23 PROCEDURE — VITAEYE VITA EYE VITAMINS: Performed by: OPHTHALMOLOGY

## 2018-04-23 PROCEDURE — 92226 OPHTHALMOSCOPY EXT SUBSEQUENT: CPT | Performed by: OPHTHALMOLOGY

## 2018-04-23 ASSESSMENT — SPHEQUIV_DERIVED
OS_SPHEQUIV: 0.125
OD_SPHEQUIV: -0.5

## 2018-04-23 ASSESSMENT — REFRACTION_AUTOREFRACTION
OS_SPHERE: +0.50
OD_CYLINDER: -0.50
OD_SPHERE: -0.25
OD_AXIS: 100
OS_CYLINDER: -0.75
OS_AXIS: 67

## 2018-04-23 ASSESSMENT — LID EXAM ASSESSMENTS
OS_BLEPHARITIS: T
OD_BLEPHARITIS: T

## 2018-04-23 ASSESSMENT — REFRACTION_MANIFEST
OU_VA: 20/
OD_VA2: 20/
OD_VA1: 20/
OS_VA2: 20/
OD_VA3: 20/
OS_VA3: 20/
OD_VA3: 20/
OU_VA: 20/
OU_VA: 20/
OS_VA1: 20/
OS_VA1: 20/
OS_VA2: 20/
OS_VA1: 20/
OS_VA2: 20/
OD_VA3: 20/
OD_VA2: 20/
OD_VA1: 20/
OS_VA3: 20/
OD_VA2: 20/
OS_VA3: 20/
OD_VA1: 20/

## 2018-04-23 ASSESSMENT — REFRACTION_CURRENTRX
OS_OVR_VA: 20/
OD_OVR_VA: 20/
OS_OVR_VA: 20/
OS_OVR_VA: 20/

## 2018-04-23 ASSESSMENT — VISUAL ACUITY
OS_BCVA: 20/25
OD_BCVA: 20/20-2

## 2018-04-23 ASSESSMENT — TEAR BREAK UP TIME (TBUT)
OS_TBUT: T
OD_TBUT: T

## 2018-04-23 ASSESSMENT — CONFRONTATIONAL VISUAL FIELD TEST (CVF)
OD_FINDINGS: FULL
OS_FINDINGS: FULL

## 2018-05-21 ENCOUNTER — DOCTOR'S OFFICE (OUTPATIENT)
Dept: URBAN - NONMETROPOLITAN AREA CLINIC 1 | Facility: CLINIC | Age: 63
Setting detail: OPHTHALMOLOGY
End: 2018-05-21
Payer: MEDICARE

## 2018-05-21 DIAGNOSIS — H43.812: ICD-10-CM

## 2018-05-21 DIAGNOSIS — H16.223: ICD-10-CM

## 2018-05-21 DIAGNOSIS — H43.813: ICD-10-CM

## 2018-05-21 DIAGNOSIS — H43.811: ICD-10-CM

## 2018-05-21 DIAGNOSIS — H25.13: ICD-10-CM

## 2018-05-21 DIAGNOSIS — H34.8310: ICD-10-CM

## 2018-05-21 PROCEDURE — 92014 COMPRE OPH EXAM EST PT 1/>: CPT | Performed by: OPHTHALMOLOGY

## 2018-05-21 PROCEDURE — 92134 CPTRZ OPH DX IMG PST SGM RTA: CPT | Performed by: OPHTHALMOLOGY

## 2018-05-21 PROCEDURE — 92226 OPHTHALMOSCOPY EXT SUBSEQUENT: CPT | Performed by: OPHTHALMOLOGY

## 2018-05-21 ASSESSMENT — REFRACTION_MANIFEST
OD_VA1: 20/
OU_VA: 20/
OD_VA1: 20/
OS_VA2: 20/
OS_VA2: 20/
OU_VA: 20/
OD_VA2: 20/
OU_VA: 20/
OS_VA2: 20/
OS_VA1: 20/
OD_VA1: 20/
OS_VA3: 20/
OD_VA2: 20/
OS_VA1: 20/
OD_VA2: 20/
OS_VA3: 20/
OS_VA3: 20/
OD_VA3: 20/
OD_VA3: 20/
OS_VA1: 20/
OD_VA3: 20/

## 2018-05-21 ASSESSMENT — CONFRONTATIONAL VISUAL FIELD TEST (CVF)
OS_FINDINGS: FULL
OD_FINDINGS: FULL

## 2018-05-21 ASSESSMENT — REFRACTION_AUTOREFRACTION
OD_CYLINDER: -0.50
OS_CYLINDER: -0.75
OS_SPHERE: +0.50
OD_SPHERE: -0.25
OS_AXIS: 67
OD_AXIS: 100

## 2018-05-21 ASSESSMENT — SPHEQUIV_DERIVED
OD_SPHEQUIV: -0.5
OS_SPHEQUIV: 0.125

## 2018-05-21 ASSESSMENT — LID EXAM ASSESSMENTS
OD_BLEPHARITIS: T
OS_BLEPHARITIS: T

## 2018-05-21 ASSESSMENT — TEAR BREAK UP TIME (TBUT)
OS_TBUT: T
OD_TBUT: T

## 2018-05-21 ASSESSMENT — REFRACTION_CURRENTRX
OD_OVR_VA: 20/
OS_OVR_VA: 20/
OS_OVR_VA: 20/
OD_OVR_VA: 20/
OD_OVR_VA: 20/
OS_OVR_VA: 20/

## 2018-05-21 ASSESSMENT — VISUAL ACUITY
OS_BCVA: 20/25
OD_BCVA: 20/25

## 2018-06-04 ENCOUNTER — DOCTOR'S OFFICE (OUTPATIENT)
Dept: URBAN - NONMETROPOLITAN AREA CLINIC 1 | Facility: CLINIC | Age: 63
Setting detail: OPHTHALMOLOGY
End: 2018-06-04
Payer: MEDICARE

## 2018-06-04 DIAGNOSIS — H16.223: ICD-10-CM

## 2018-06-04 DIAGNOSIS — H34.8310: ICD-10-CM

## 2018-06-04 DIAGNOSIS — H25.13: ICD-10-CM

## 2018-06-04 DIAGNOSIS — H43.813: ICD-10-CM

## 2018-06-04 PROCEDURE — 92014 COMPRE OPH EXAM EST PT 1/>: CPT | Performed by: OPHTHALMOLOGY

## 2018-06-04 PROCEDURE — 92250 FUNDUS PHOTOGRAPHY W/I&R: CPT | Performed by: OPHTHALMOLOGY

## 2018-06-04 PROCEDURE — 92235 FLUORESCEIN ANGRPH MLTIFRAME: CPT | Performed by: OPHTHALMOLOGY

## 2018-06-04 ASSESSMENT — LID EXAM ASSESSMENTS
OD_BLEPHARITIS: T
OS_BLEPHARITIS: T

## 2018-06-04 ASSESSMENT — TEAR BREAK UP TIME (TBUT)
OD_TBUT: T
OS_TBUT: T

## 2018-06-04 ASSESSMENT — CONFRONTATIONAL VISUAL FIELD TEST (CVF)
OS_FINDINGS: FULL
OD_FINDINGS: FULL

## 2018-06-05 ASSESSMENT — REFRACTION_MANIFEST
OS_VA2: 20/
OS_VA3: 20/
OU_VA: 20/
OU_VA: 20/
OS_VA1: 20/
OS_VA3: 20/
OD_VA2: 20/
OD_VA1: 20/
OS_VA2: 20/
OS_VA3: 20/
OD_VA3: 20/
OS_VA1: 20/
OD_VA3: 20/
OS_VA2: 20/
OD_VA1: 20/
OU_VA: 20/
OD_VA1: 20/
OD_VA2: 20/
OS_VA1: 20/
OD_VA3: 20/
OD_VA2: 20/

## 2018-06-05 ASSESSMENT — REFRACTION_AUTOREFRACTION
OD_CYLINDER: -0.50
OD_SPHERE: -0.25
OS_CYLINDER: -0.75
OD_AXIS: 100
OS_AXIS: 67
OS_SPHERE: +0.50

## 2018-06-05 ASSESSMENT — REFRACTION_CURRENTRX
OS_OVR_VA: 20/
OS_OVR_VA: 20/
OD_OVR_VA: 20/
OD_OVR_VA: 20/
OS_OVR_VA: 20/
OD_OVR_VA: 20/

## 2018-06-05 ASSESSMENT — VISUAL ACUITY
OS_BCVA: 20/20
OD_BCVA: 20/20-2

## 2018-06-05 ASSESSMENT — SPHEQUIV_DERIVED
OD_SPHEQUIV: -0.5
OS_SPHEQUIV: 0.125

## 2018-06-11 ENCOUNTER — DOCTOR'S OFFICE (OUTPATIENT)
Dept: URBAN - NONMETROPOLITAN AREA CLINIC 1 | Facility: CLINIC | Age: 63
Setting detail: OPHTHALMOLOGY
End: 2018-06-11
Payer: MEDICARE

## 2018-06-11 DIAGNOSIS — H34.8310: ICD-10-CM

## 2018-06-11 PROCEDURE — 67028 INJECTION EYE DRUG: CPT | Performed by: OPHTHALMOLOGY

## 2018-06-12 ENCOUNTER — RX ONLY (RX ONLY)
Age: 63
End: 2018-06-12

## 2018-06-12 ASSESSMENT — VISUAL ACUITY
OD_BCVA: 20/20-2
OS_BCVA: 20/20

## 2018-06-12 ASSESSMENT — REFRACTION_AUTOREFRACTION
OS_CYLINDER: -0.75
OD_AXIS: 100
OD_CYLINDER: -0.50
OS_SPHERE: +0.50
OS_AXIS: 67
OD_SPHERE: -0.25

## 2018-06-12 ASSESSMENT — SPHEQUIV_DERIVED
OD_SPHEQUIV: -0.5
OS_SPHEQUIV: 0.125

## 2018-07-09 ENCOUNTER — DOCTOR'S OFFICE (OUTPATIENT)
Dept: URBAN - NONMETROPOLITAN AREA CLINIC 1 | Facility: CLINIC | Age: 63
Setting detail: OPHTHALMOLOGY
End: 2018-07-09
Payer: MEDICARE

## 2018-07-09 DIAGNOSIS — H43.811: ICD-10-CM

## 2018-07-09 DIAGNOSIS — H25.13: ICD-10-CM

## 2018-07-09 DIAGNOSIS — H34.8310: ICD-10-CM

## 2018-07-09 DIAGNOSIS — H16.223: ICD-10-CM

## 2018-07-09 DIAGNOSIS — H43.812: ICD-10-CM

## 2018-07-09 DIAGNOSIS — H43.813: ICD-10-CM

## 2018-07-09 DIAGNOSIS — H16.222: ICD-10-CM

## 2018-07-09 PROCEDURE — 92014 COMPRE OPH EXAM EST PT 1/>: CPT | Performed by: OPHTHALMOLOGY

## 2018-07-09 PROCEDURE — 83861 MICROFLUID ANALY TEARS: CPT | Performed by: OPHTHALMOLOGY

## 2018-07-09 PROCEDURE — 92226 OPHTHALMOSCOPY EXT SUBSEQUENT: CPT | Performed by: OPHTHALMOLOGY

## 2018-07-09 PROCEDURE — 92134 CPTRZ OPH DX IMG PST SGM RTA: CPT | Performed by: OPHTHALMOLOGY

## 2018-07-09 ASSESSMENT — REFRACTION_AUTOREFRACTION
OS_AXIS: 67
OS_CYLINDER: -0.75
OS_SPHERE: +0.50
OD_AXIS: 100
OD_CYLINDER: -0.50
OD_SPHERE: -0.25

## 2018-07-09 ASSESSMENT — REFRACTION_MANIFEST
OU_VA: 20/
OS_VA1: 20/
OU_VA: 20/
OS_VA3: 20/
OD_VA2: 20/
OD_VA3: 20/
OS_VA1: 20/
OS_VA1: 20/
OD_VA1: 20/
OU_VA: 20/
OD_VA3: 20/
OS_VA3: 20/
OD_VA2: 20/
OD_VA3: 20/
OS_VA2: 20/
OS_VA2: 20/
OD_VA1: 20/
OS_VA3: 20/
OD_VA1: 20/
OS_VA2: 20/
OD_VA2: 20/

## 2018-07-09 ASSESSMENT — LID EXAM ASSESSMENTS
OD_BLEPHARITIS: T
OS_BLEPHARITIS: T

## 2018-07-09 ASSESSMENT — REFRACTION_CURRENTRX
OS_OVR_VA: 20/
OD_OVR_VA: 20/
OD_OVR_VA: 20/
OS_OVR_VA: 20/
OD_OVR_VA: 20/
OS_OVR_VA: 20/

## 2018-07-09 ASSESSMENT — SPHEQUIV_DERIVED
OS_SPHEQUIV: 0.125
OD_SPHEQUIV: -0.5

## 2018-07-09 ASSESSMENT — CONFRONTATIONAL VISUAL FIELD TEST (CVF)
OD_FINDINGS: FULL
OS_FINDINGS: FULL

## 2018-07-09 ASSESSMENT — VISUAL ACUITY
OD_BCVA: 20/20-2
OS_BCVA: 20/20-1

## 2018-07-09 ASSESSMENT — TEAR BREAK UP TIME (TBUT)
OS_TBUT: T
OD_TBUT: T

## 2018-07-16 ENCOUNTER — DOCTOR'S OFFICE (OUTPATIENT)
Dept: URBAN - NONMETROPOLITAN AREA CLINIC 1 | Facility: CLINIC | Age: 63
Setting detail: OPHTHALMOLOGY
End: 2018-07-16
Payer: MEDICARE

## 2018-07-16 DIAGNOSIS — H34.8310: ICD-10-CM

## 2018-07-16 PROCEDURE — 67028 INJECTION EYE DRUG: CPT | Performed by: OPHTHALMOLOGY

## 2018-07-17 ASSESSMENT — REFRACTION_AUTOREFRACTION
OD_AXIS: 100
OD_CYLINDER: -0.50
OS_SPHERE: +0.50
OS_AXIS: 67
OS_CYLINDER: -0.75
OD_SPHERE: -0.25

## 2018-07-17 ASSESSMENT — SPHEQUIV_DERIVED
OD_SPHEQUIV: -0.5
OS_SPHEQUIV: 0.125

## 2018-07-17 ASSESSMENT — VISUAL ACUITY
OD_BCVA: 20/20-2
OS_BCVA: 20/20-1

## 2018-08-20 ENCOUNTER — DOCTOR'S OFFICE (OUTPATIENT)
Dept: URBAN - NONMETROPOLITAN AREA CLINIC 1 | Facility: CLINIC | Age: 63
Setting detail: OPHTHALMOLOGY
End: 2018-08-20
Payer: MEDICARE

## 2018-08-20 DIAGNOSIS — H43.813: ICD-10-CM

## 2018-08-20 DIAGNOSIS — H43.812: ICD-10-CM

## 2018-08-20 DIAGNOSIS — H25.13: ICD-10-CM

## 2018-08-20 DIAGNOSIS — H43.811: ICD-10-CM

## 2018-08-20 DIAGNOSIS — H34.8310: ICD-10-CM

## 2018-08-20 DIAGNOSIS — H16.223: ICD-10-CM

## 2018-08-20 PROCEDURE — 92134 CPTRZ OPH DX IMG PST SGM RTA: CPT | Performed by: OPHTHALMOLOGY

## 2018-08-20 PROCEDURE — 92014 COMPRE OPH EXAM EST PT 1/>: CPT | Performed by: OPHTHALMOLOGY

## 2018-08-20 PROCEDURE — 83861 MICROFLUID ANALY TEARS: CPT | Performed by: OPHTHALMOLOGY

## 2018-08-20 PROCEDURE — 92226 OPHTHALMOSCOPY EXT SUBSEQUENT: CPT | Performed by: OPHTHALMOLOGY

## 2018-08-20 ASSESSMENT — REFRACTION_AUTOREFRACTION
OD_SPHERE: -0.25
OD_CYLINDER: -0.50
OS_SPHERE: +0.50
OS_AXIS: 67
OD_AXIS: 100
OS_CYLINDER: -0.75

## 2018-08-20 ASSESSMENT — REFRACTION_MANIFEST
OU_VA: 20/
OS_VA3: 20/
OS_VA2: 20/
OU_VA: 20/
OS_VA2: 20/
OU_VA: 20/
OS_VA2: 20/
OD_VA2: 20/
OD_VA1: 20/
OD_VA1: 20/
OS_VA1: 20/
OD_VA3: 20/
OD_VA2: 20/
OD_VA3: 20/
OS_VA3: 20/
OD_VA2: 20/
OS_VA1: 20/
OS_VA1: 20/
OD_VA3: 20/
OS_VA3: 20/
OD_VA1: 20/

## 2018-08-20 ASSESSMENT — VISUAL ACUITY
OD_BCVA: 20/25-2
OS_BCVA: 20/20-2

## 2018-08-20 ASSESSMENT — REFRACTION_CURRENTRX
OS_OVR_VA: 20/
OD_OVR_VA: 20/
OS_OVR_VA: 20/
OS_OVR_VA: 20/

## 2018-08-20 ASSESSMENT — LID EXAM ASSESSMENTS
OS_BLEPHARITIS: T
OD_BLEPHARITIS: T

## 2018-08-20 ASSESSMENT — SPHEQUIV_DERIVED
OS_SPHEQUIV: 0.125
OD_SPHEQUIV: -0.5

## 2018-08-20 ASSESSMENT — CONFRONTATIONAL VISUAL FIELD TEST (CVF)
OD_FINDINGS: FULL
OS_FINDINGS: FULL

## 2018-08-20 ASSESSMENT — TEAR BREAK UP TIME (TBUT)
OS_TBUT: T
OD_TBUT: T

## 2018-09-10 ENCOUNTER — DOCTOR'S OFFICE (OUTPATIENT)
Dept: URBAN - NONMETROPOLITAN AREA CLINIC 1 | Facility: CLINIC | Age: 63
Setting detail: OPHTHALMOLOGY
End: 2018-09-10
Payer: MEDICARE

## 2018-09-10 DIAGNOSIS — H34.8310: ICD-10-CM

## 2018-09-10 PROCEDURE — 67028 INJECTION EYE DRUG: CPT | Performed by: OPHTHALMOLOGY

## 2018-09-11 ASSESSMENT — VISUAL ACUITY
OD_BCVA: 20/25-2
OS_BCVA: 20/20-2

## 2018-09-11 ASSESSMENT — SPHEQUIV_DERIVED
OD_SPHEQUIV: -0.5
OS_SPHEQUIV: 0.125

## 2018-09-11 ASSESSMENT — REFRACTION_AUTOREFRACTION
OS_CYLINDER: -0.75
OD_CYLINDER: -0.50
OD_SPHERE: -0.25
OD_AXIS: 100
OS_SPHERE: +0.50
OS_AXIS: 67

## 2018-09-24 ENCOUNTER — DOCTOR'S OFFICE (OUTPATIENT)
Dept: URBAN - NONMETROPOLITAN AREA CLINIC 1 | Facility: CLINIC | Age: 63
Setting detail: OPHTHALMOLOGY
End: 2018-09-24
Payer: MEDICARE

## 2018-09-24 DIAGNOSIS — H16.222: ICD-10-CM

## 2018-09-24 DIAGNOSIS — H16.221: ICD-10-CM

## 2018-09-24 DIAGNOSIS — H43.811: ICD-10-CM

## 2018-09-24 DIAGNOSIS — H25.13: ICD-10-CM

## 2018-09-24 DIAGNOSIS — H43.813: ICD-10-CM

## 2018-09-24 DIAGNOSIS — H43.812: ICD-10-CM

## 2018-09-24 DIAGNOSIS — H16.223: ICD-10-CM

## 2018-09-24 DIAGNOSIS — H34.8310: ICD-10-CM

## 2018-09-24 PROCEDURE — 92014 COMPRE OPH EXAM EST PT 1/>: CPT | Performed by: OPHTHALMOLOGY

## 2018-09-24 PROCEDURE — 83861 MICROFLUID ANALY TEARS: CPT | Performed by: OPHTHALMOLOGY

## 2018-09-24 PROCEDURE — 92226 OPHTHALMOSCOPY EXT SUBSEQUENT: CPT | Performed by: OPHTHALMOLOGY

## 2018-09-24 PROCEDURE — 92134 CPTRZ OPH DX IMG PST SGM RTA: CPT | Performed by: OPHTHALMOLOGY

## 2018-09-24 ASSESSMENT — VISUAL ACUITY
OS_BCVA: 20/20-2
OD_BCVA: 20/25

## 2018-09-24 ASSESSMENT — CONFRONTATIONAL VISUAL FIELD TEST (CVF)
OS_FINDINGS: FULL
OD_FINDINGS: FULL

## 2018-09-24 ASSESSMENT — REFRACTION_AUTOREFRACTION
OD_SPHERE: -0.25
OS_SPHERE: +0.50
OD_AXIS: 100
OS_AXIS: 67
OS_CYLINDER: -0.75
OD_CYLINDER: -0.50

## 2018-09-24 ASSESSMENT — REFRACTION_MANIFEST
OD_VA1: 20/
OS_VA2: 20/
OD_VA3: 20/
OD_VA1: 20/
OS_VA1: 20/
OD_VA2: 20/
OS_VA1: 20/
OU_VA: 20/
OU_VA: 20/
OS_VA3: 20/
OD_VA3: 20/
OS_VA3: 20/
OS_VA2: 20/
OD_VA2: 20/

## 2018-09-24 ASSESSMENT — LID EXAM ASSESSMENTS
OS_BLEPHARITIS: T
OD_BLEPHARITIS: T

## 2018-09-24 ASSESSMENT — REFRACTION_CURRENTRX
OD_OVR_VA: 20/
OS_OVR_VA: 20/
OS_OVR_VA: 20/
OD_OVR_VA: 20/
OS_OVR_VA: 20/
OD_OVR_VA: 20/

## 2018-09-24 ASSESSMENT — TEAR BREAK UP TIME (TBUT)
OD_TBUT: T
OS_TBUT: T

## 2018-09-24 ASSESSMENT — SPHEQUIV_DERIVED
OD_SPHEQUIV: -0.5
OS_SPHEQUIV: 0.125

## 2018-11-26 ENCOUNTER — DOCTOR'S OFFICE (OUTPATIENT)
Dept: URBAN - NONMETROPOLITAN AREA CLINIC 1 | Facility: CLINIC | Age: 63
Setting detail: OPHTHALMOLOGY
End: 2018-11-26
Payer: MEDICARE

## 2018-11-26 DIAGNOSIS — H16.223: ICD-10-CM

## 2018-11-26 DIAGNOSIS — H25.13: ICD-10-CM

## 2018-11-26 DIAGNOSIS — H16.222: ICD-10-CM

## 2018-11-26 DIAGNOSIS — H43.811: ICD-10-CM

## 2018-11-26 DIAGNOSIS — H34.8310: ICD-10-CM

## 2018-11-26 DIAGNOSIS — H43.813: ICD-10-CM

## 2018-11-26 DIAGNOSIS — H43.812: ICD-10-CM

## 2018-11-26 PROCEDURE — 83861 MICROFLUID ANALY TEARS: CPT | Performed by: OPHTHALMOLOGY

## 2018-11-26 PROCEDURE — 92134 CPTRZ OPH DX IMG PST SGM RTA: CPT | Performed by: OPHTHALMOLOGY

## 2018-11-26 PROCEDURE — 92226 OPHTHALMOSCOPY EXT SUBSEQUENT: CPT | Performed by: OPHTHALMOLOGY

## 2018-11-26 PROCEDURE — 92014 COMPRE OPH EXAM EST PT 1/>: CPT | Performed by: OPHTHALMOLOGY

## 2018-11-26 ASSESSMENT — LID EXAM ASSESSMENTS
OD_BLEPHARITIS: T
OS_BLEPHARITIS: T

## 2018-11-26 ASSESSMENT — REFRACTION_CURRENTRX
OD_OVR_VA: 20/
OD_OVR_VA: 20/
OS_OVR_VA: 20/
OD_OVR_VA: 20/

## 2018-11-26 ASSESSMENT — VISUAL ACUITY
OD_BCVA: 20/25-1
OS_BCVA: 20/25

## 2018-11-26 ASSESSMENT — TEAR BREAK UP TIME (TBUT)
OS_TBUT: T
OD_TBUT: T

## 2018-11-26 ASSESSMENT — CONFRONTATIONAL VISUAL FIELD TEST (CVF)
OS_FINDINGS: FULL
OD_FINDINGS: FULL

## 2018-11-26 ASSESSMENT — SPHEQUIV_DERIVED
OS_SPHEQUIV: 0.125
OD_SPHEQUIV: -0.5

## 2018-11-26 ASSESSMENT — REFRACTION_MANIFEST
OS_VA2: 20/
OD_VA1: 20/
OD_VA3: 20/
OS_VA2: 20/
OS_VA3: 20/
OU_VA: 20/
OS_VA1: 20/
OD_VA2: 20/
OS_VA3: 20/
OD_VA3: 20/
OU_VA: 20/
OD_VA1: 20/
OD_VA2: 20/
OS_VA1: 20/

## 2018-11-26 ASSESSMENT — REFRACTION_AUTOREFRACTION
OD_SPHERE: -0.25
OS_CYLINDER: -0.75
OD_AXIS: 100
OS_AXIS: 67
OS_SPHERE: +0.50
OD_CYLINDER: -0.50

## 2018-12-03 ENCOUNTER — DOCTOR'S OFFICE (OUTPATIENT)
Dept: URBAN - NONMETROPOLITAN AREA CLINIC 1 | Facility: CLINIC | Age: 63
Setting detail: OPHTHALMOLOGY
End: 2018-12-03
Payer: MEDICARE

## 2018-12-03 DIAGNOSIS — H43.812: ICD-10-CM

## 2018-12-03 DIAGNOSIS — H43.811: ICD-10-CM

## 2018-12-03 DIAGNOSIS — H34.8310: ICD-10-CM

## 2018-12-03 PROCEDURE — 67028 INJECTION EYE DRUG: CPT | Performed by: OPHTHALMOLOGY

## 2018-12-03 PROCEDURE — 92250 FUNDUS PHOTOGRAPHY W/I&R: CPT | Performed by: OPHTHALMOLOGY

## 2018-12-03 PROCEDURE — 92226 OPHTHALMOSCOPY EXT SUBSEQUENT: CPT | Performed by: OPHTHALMOLOGY

## 2018-12-03 PROCEDURE — 92235 FLUORESCEIN ANGRPH MLTIFRAME: CPT | Performed by: OPHTHALMOLOGY

## 2018-12-03 ASSESSMENT — CONFRONTATIONAL VISUAL FIELD TEST (CVF)
OD_FINDINGS: FULL
OS_FINDINGS: FULL

## 2018-12-03 ASSESSMENT — REFRACTION_MANIFEST
OD_VA2: 20/
OS_VA2: 20/
OD_VA1: 20/
OD_VA3: 20/
OU_VA: 20/
OD_VA3: 20/
OD_VA2: 20/
OS_VA2: 20/
OS_VA3: 20/
OS_VA1: 20/
OS_VA3: 20/
OD_VA1: 20/
OS_VA1: 20/
OU_VA: 20/

## 2018-12-03 ASSESSMENT — REFRACTION_AUTOREFRACTION
OD_CYLINDER: -0.50
OD_AXIS: 100
OS_SPHERE: +0.50
OD_SPHERE: -0.25
OS_CYLINDER: -0.75
OS_AXIS: 67

## 2018-12-03 ASSESSMENT — TEAR BREAK UP TIME (TBUT)
OD_TBUT: T
OS_TBUT: T

## 2018-12-03 ASSESSMENT — REFRACTION_CURRENTRX
OD_OVR_VA: 20/
OD_OVR_VA: 20/
OS_OVR_VA: 20/
OD_OVR_VA: 20/

## 2018-12-03 ASSESSMENT — VISUAL ACUITY
OD_BCVA: 20/25-1
OS_BCVA: 20/25

## 2018-12-03 ASSESSMENT — LID EXAM ASSESSMENTS
OD_BLEPHARITIS: T
OS_BLEPHARITIS: T

## 2018-12-03 ASSESSMENT — SPHEQUIV_DERIVED
OS_SPHEQUIV: 0.125
OD_SPHEQUIV: -0.5

## 2019-01-07 ENCOUNTER — DOCTOR'S OFFICE (OUTPATIENT)
Dept: URBAN - NONMETROPOLITAN AREA CLINIC 1 | Facility: CLINIC | Age: 64
Setting detail: OPHTHALMOLOGY
End: 2019-01-07
Payer: MEDICARE

## 2019-01-07 DIAGNOSIS — H16.223: ICD-10-CM

## 2019-01-07 DIAGNOSIS — H34.8310: ICD-10-CM

## 2019-01-07 DIAGNOSIS — H43.812: ICD-10-CM

## 2019-01-07 DIAGNOSIS — H43.813: ICD-10-CM

## 2019-01-07 DIAGNOSIS — H43.811: ICD-10-CM

## 2019-01-07 PROCEDURE — 92014 COMPRE OPH EXAM EST PT 1/>: CPT | Performed by: OPHTHALMOLOGY

## 2019-01-07 PROCEDURE — 92134 CPTRZ OPH DX IMG PST SGM RTA: CPT | Performed by: OPHTHALMOLOGY

## 2019-01-07 PROCEDURE — 92226 OPHTHALMOSCOPY EXT SUBSEQUENT: CPT | Performed by: OPHTHALMOLOGY

## 2019-01-07 ASSESSMENT — REFRACTION_MANIFEST
OD_VA3: 20/
OD_VA2: 20/
OS_VA2: 20/
OS_VA3: 20/
OD_VA3: 20/
OS_VA3: 20/
OD_VA2: 20/
OS_VA2: 20/
OS_VA1: 20/
OD_VA1: 20/
OU_VA: 20/
OU_VA: 20/
OS_VA1: 20/
OD_VA1: 20/

## 2019-01-07 ASSESSMENT — REFRACTION_CURRENTRX
OS_OVR_VA: 20/
OS_OVR_VA: 20/
OD_OVR_VA: 20/
OD_OVR_VA: 20/
OS_OVR_VA: 20/
OD_OVR_VA: 20/

## 2019-01-07 ASSESSMENT — REFRACTION_AUTOREFRACTION
OD_SPHERE: -0.25
OS_SPHERE: +0.50
OS_CYLINDER: -0.75
OD_AXIS: 100
OD_CYLINDER: -0.50
OS_AXIS: 67

## 2019-01-07 ASSESSMENT — LID EXAM ASSESSMENTS
OD_BLEPHARITIS: T
OS_BLEPHARITIS: T

## 2019-01-07 ASSESSMENT — TEAR BREAK UP TIME (TBUT)
OD_TBUT: T
OS_TBUT: T

## 2019-01-07 ASSESSMENT — CONFRONTATIONAL VISUAL FIELD TEST (CVF)
OD_FINDINGS: FULL
OS_FINDINGS: FULL

## 2019-01-07 ASSESSMENT — SPHEQUIV_DERIVED
OD_SPHEQUIV: -0.5
OS_SPHEQUIV: 0.125

## 2019-01-07 ASSESSMENT — VISUAL ACUITY
OS_BCVA: 20/25
OD_BCVA: 20/25

## 2019-01-21 ENCOUNTER — DOCTOR'S OFFICE (OUTPATIENT)
Dept: URBAN - NONMETROPOLITAN AREA CLINIC 1 | Facility: CLINIC | Age: 64
Setting detail: OPHTHALMOLOGY
End: 2019-01-21
Payer: MEDICARE

## 2019-01-21 DIAGNOSIS — H34.8310: ICD-10-CM

## 2019-01-21 PROCEDURE — 67028 INJECTION EYE DRUG: CPT | Performed by: OPHTHALMOLOGY

## 2019-01-21 ASSESSMENT — REFRACTION_MANIFEST
OD_VA2: 20/
OS_VA2: 20/
OD_VA1: 20/
OS_VA3: 20/
OD_VA3: 20/
OD_VA3: 20/
OD_VA1: 20/
OU_VA: 20/
OS_VA1: 20/
OS_VA2: 20/
OU_VA: 20/
OS_VA3: 20/
OD_VA2: 20/
OS_VA1: 20/

## 2019-01-21 ASSESSMENT — VISUAL ACUITY
OD_BCVA: 20/25
OS_BCVA: 20/25

## 2019-01-21 ASSESSMENT — REFRACTION_CURRENTRX
OD_OVR_VA: 20/
OS_OVR_VA: 20/

## 2019-01-21 ASSESSMENT — SPHEQUIV_DERIVED
OS_SPHEQUIV: 0.125
OD_SPHEQUIV: -0.5

## 2019-01-21 ASSESSMENT — REFRACTION_AUTOREFRACTION
OD_CYLINDER: -0.50
OS_CYLINDER: -0.75
OS_SPHERE: +0.50
OD_AXIS: 100
OD_SPHERE: -0.25
OS_AXIS: 67

## 2019-02-11 ENCOUNTER — DOCTOR'S OFFICE (OUTPATIENT)
Dept: URBAN - NONMETROPOLITAN AREA CLINIC 1 | Facility: CLINIC | Age: 64
Setting detail: OPHTHALMOLOGY
End: 2019-02-11
Payer: MEDICARE

## 2019-02-11 DIAGNOSIS — H43.812: ICD-10-CM

## 2019-02-11 DIAGNOSIS — H34.8310: ICD-10-CM

## 2019-02-11 DIAGNOSIS — H25.13: ICD-10-CM

## 2019-02-11 DIAGNOSIS — H16.223: ICD-10-CM

## 2019-02-11 DIAGNOSIS — H16.222: ICD-10-CM

## 2019-02-11 DIAGNOSIS — H43.813: ICD-10-CM

## 2019-02-11 DIAGNOSIS — H43.811: ICD-10-CM

## 2019-02-11 PROCEDURE — 92014 COMPRE OPH EXAM EST PT 1/>: CPT | Performed by: OPHTHALMOLOGY

## 2019-02-11 PROCEDURE — 92226 OPHTHALMOSCOPY EXT SUBSEQUENT: CPT | Performed by: OPHTHALMOLOGY

## 2019-02-11 PROCEDURE — 92134 CPTRZ OPH DX IMG PST SGM RTA: CPT | Performed by: OPHTHALMOLOGY

## 2019-02-11 PROCEDURE — 83861 MICROFLUID ANALY TEARS: CPT | Performed by: OPHTHALMOLOGY

## 2019-02-11 ASSESSMENT — SPHEQUIV_DERIVED
OS_SPHEQUIV: 0.125
OD_SPHEQUIV: -0.5

## 2019-02-11 ASSESSMENT — VISUAL ACUITY
OS_BCVA: 20/20-2
OD_BCVA: 20/20-1

## 2019-02-11 ASSESSMENT — REFRACTION_CURRENTRX
OD_OVR_VA: 20/
OD_OVR_VA: 20/
OS_OVR_VA: 20/
OD_OVR_VA: 20/

## 2019-02-11 ASSESSMENT — REFRACTION_MANIFEST
OD_VA3: 20/
OS_VA1: 20/
OD_VA2: 20/
OS_VA2: 20/
OS_VA2: 20/
OD_VA3: 20/
OD_VA2: 20/
OD_VA1: 20/
OU_VA: 20/
OS_VA3: 20/
OS_VA1: 20/
OD_VA1: 20/
OU_VA: 20/
OS_VA3: 20/

## 2019-02-11 ASSESSMENT — REFRACTION_AUTOREFRACTION
OD_AXIS: 100
OS_SPHERE: +0.50
OS_AXIS: 67
OD_CYLINDER: -0.50
OS_CYLINDER: -0.75
OD_SPHERE: -0.25

## 2019-02-11 ASSESSMENT — CONFRONTATIONAL VISUAL FIELD TEST (CVF)
OS_FINDINGS: FULL
OD_FINDINGS: FULL

## 2019-02-11 ASSESSMENT — TEAR BREAK UP TIME (TBUT)
OD_TBUT: T
OS_TBUT: T

## 2019-02-11 ASSESSMENT — LID EXAM ASSESSMENTS
OD_BLEPHARITIS: T
OS_BLEPHARITIS: T

## 2019-02-25 ENCOUNTER — DOCTOR'S OFFICE (OUTPATIENT)
Dept: URBAN - NONMETROPOLITAN AREA CLINIC 1 | Facility: CLINIC | Age: 64
Setting detail: OPHTHALMOLOGY
End: 2019-02-25
Payer: MEDICARE

## 2019-02-25 DIAGNOSIS — H34.8310: ICD-10-CM

## 2019-02-25 PROCEDURE — 67028 INJECTION EYE DRUG: CPT | Performed by: OPHTHALMOLOGY

## 2019-02-26 ASSESSMENT — REFRACTION_AUTOREFRACTION
OS_AXIS: 67
OS_SPHERE: +0.50
OD_AXIS: 100
OS_CYLINDER: -0.75
OD_SPHERE: -0.25
OD_CYLINDER: -0.50

## 2019-02-26 ASSESSMENT — REFRACTION_MANIFEST
OD_VA2: 20/
OD_VA1: 20/
OU_VA: 20/
OS_VA3: 20/
OS_VA2: 20/
OD_VA1: 20/
OD_VA3: 20/
OS_VA3: 20/
OS_VA2: 20/
OS_VA1: 20/
OU_VA: 20/
OS_VA1: 20/
OD_VA3: 20/
OD_VA2: 20/

## 2019-02-26 ASSESSMENT — REFRACTION_CURRENTRX
OS_OVR_VA: 20/
OD_OVR_VA: 20/
OS_OVR_VA: 20/
OD_OVR_VA: 20/
OD_OVR_VA: 20/
OS_OVR_VA: 20/

## 2019-02-26 ASSESSMENT — VISUAL ACUITY
OS_BCVA: 20/20-2
OD_BCVA: 20/20-1

## 2019-02-26 ASSESSMENT — SPHEQUIV_DERIVED
OS_SPHEQUIV: 0.125
OD_SPHEQUIV: -0.5

## 2019-03-11 ENCOUNTER — DOCTOR'S OFFICE (OUTPATIENT)
Dept: URBAN - NONMETROPOLITAN AREA CLINIC 1 | Facility: CLINIC | Age: 64
Setting detail: OPHTHALMOLOGY
End: 2019-03-11
Payer: MEDICARE

## 2019-03-11 DIAGNOSIS — H34.8310: ICD-10-CM

## 2019-03-11 DIAGNOSIS — H43.812: ICD-10-CM

## 2019-03-11 DIAGNOSIS — H16.222: ICD-10-CM

## 2019-03-11 DIAGNOSIS — H43.813: ICD-10-CM

## 2019-03-11 DIAGNOSIS — H16.223: ICD-10-CM

## 2019-03-11 DIAGNOSIS — H43.811: ICD-10-CM

## 2019-03-11 PROCEDURE — 83861 MICROFLUID ANALY TEARS: CPT | Performed by: OPHTHALMOLOGY

## 2019-03-11 PROCEDURE — 92226 OPHTHALMOSCOPY EXT SUBSEQUENT: CPT | Performed by: OPHTHALMOLOGY

## 2019-03-11 PROCEDURE — 92134 CPTRZ OPH DX IMG PST SGM RTA: CPT | Performed by: OPHTHALMOLOGY

## 2019-03-11 PROCEDURE — 92014 COMPRE OPH EXAM EST PT 1/>: CPT | Performed by: OPHTHALMOLOGY

## 2019-03-11 ASSESSMENT — REFRACTION_AUTOREFRACTION
OS_AXIS: 67
OS_SPHERE: +0.50
OD_AXIS: 100
OD_CYLINDER: -0.50
OD_SPHERE: -0.25
OS_CYLINDER: -0.75

## 2019-03-11 ASSESSMENT — SPHEQUIV_DERIVED
OD_SPHEQUIV: -0.5
OS_SPHEQUIV: 0.125

## 2019-03-11 ASSESSMENT — VISUAL ACUITY
OD_BCVA: 20/20-1
OS_BCVA: 20/20-1

## 2019-03-11 ASSESSMENT — REFRACTION_MANIFEST
OD_VA1: 20/
OS_VA1: 20/
OS_VA1: 20/
OU_VA: 20/
OS_VA3: 20/
OD_VA2: 20/
OD_VA3: 20/
OS_VA3: 20/
OU_VA: 20/
OD_VA3: 20/
OD_VA2: 20/
OD_VA1: 20/
OS_VA2: 20/
OS_VA2: 20/

## 2019-03-11 ASSESSMENT — CONFRONTATIONAL VISUAL FIELD TEST (CVF)
OS_FINDINGS: FULL
OD_FINDINGS: FULL

## 2019-03-11 ASSESSMENT — REFRACTION_CURRENTRX
OD_OVR_VA: 20/
OS_OVR_VA: 20/

## 2019-03-11 ASSESSMENT — LID EXAM ASSESSMENTS
OD_BLEPHARITIS: T
OS_BLEPHARITIS: T

## 2019-03-11 ASSESSMENT — TEAR BREAK UP TIME (TBUT)
OS_TBUT: T
OD_TBUT: T

## 2019-03-25 ENCOUNTER — DOCTOR'S OFFICE (OUTPATIENT)
Dept: URBAN - NONMETROPOLITAN AREA CLINIC 1 | Facility: CLINIC | Age: 64
Setting detail: OPHTHALMOLOGY
End: 2019-03-25
Payer: MEDICARE

## 2019-03-25 DIAGNOSIS — H43.813: ICD-10-CM

## 2019-03-25 DIAGNOSIS — H34.8310: ICD-10-CM

## 2019-03-25 DIAGNOSIS — H16.223: ICD-10-CM

## 2019-03-25 DIAGNOSIS — H25.13: ICD-10-CM

## 2019-03-25 PROCEDURE — 92235 FLUORESCEIN ANGRPH MLTIFRAME: CPT | Performed by: OPHTHALMOLOGY

## 2019-03-25 PROCEDURE — 92250 FUNDUS PHOTOGRAPHY W/I&R: CPT | Performed by: OPHTHALMOLOGY

## 2019-03-25 PROCEDURE — 92014 COMPRE OPH EXAM EST PT 1/>: CPT | Performed by: OPHTHALMOLOGY

## 2019-03-25 ASSESSMENT — REFRACTION_CURRENTRX
OD_OVR_VA: 20/
OS_OVR_VA: 20/
OS_OVR_VA: 20/
OD_OVR_VA: 20/
OD_OVR_VA: 20/
OS_OVR_VA: 20/

## 2019-03-25 ASSESSMENT — REFRACTION_MANIFEST
OU_VA: 20/
OD_VA1: 20/
OS_VA3: 20/
OD_VA2: 20/
OS_VA1: 20/
OS_VA2: 20/
OU_VA: 20/
OD_VA3: 20/
OS_VA2: 20/
OS_VA3: 20/
OD_VA2: 20/
OS_VA1: 20/
OD_VA3: 20/
OD_VA1: 20/

## 2019-03-25 ASSESSMENT — TEAR BREAK UP TIME (TBUT)
OS_TBUT: T
OD_TBUT: T

## 2019-03-25 ASSESSMENT — LID EXAM ASSESSMENTS
OS_BLEPHARITIS: T
OD_BLEPHARITIS: T

## 2019-03-25 ASSESSMENT — VISUAL ACUITY
OD_BCVA: 20/20-1
OS_BCVA: 20/20-1

## 2019-03-25 ASSESSMENT — REFRACTION_AUTOREFRACTION
OS_CYLINDER: -0.75
OD_CYLINDER: -0.50
OS_AXIS: 67
OS_SPHERE: +0.50
OD_AXIS: 100
OD_SPHERE: -0.25

## 2019-03-25 ASSESSMENT — CONFRONTATIONAL VISUAL FIELD TEST (CVF)
OS_FINDINGS: FULL
OD_FINDINGS: FULL

## 2019-03-25 ASSESSMENT — SPHEQUIV_DERIVED
OS_SPHEQUIV: 0.125
OD_SPHEQUIV: -0.5

## 2019-04-01 ENCOUNTER — DOCTOR'S OFFICE (OUTPATIENT)
Dept: URBAN - NONMETROPOLITAN AREA CLINIC 1 | Facility: CLINIC | Age: 64
Setting detail: OPHTHALMOLOGY
End: 2019-04-01
Payer: MEDICARE

## 2019-04-01 DIAGNOSIS — H34.8310: ICD-10-CM

## 2019-04-01 PROCEDURE — 67028 INJECTION EYE DRUG: CPT | Performed by: OPHTHALMOLOGY

## 2019-04-01 ASSESSMENT — SPHEQUIV_DERIVED
OS_SPHEQUIV: 0.125
OD_SPHEQUIV: -0.5

## 2019-04-01 ASSESSMENT — REFRACTION_AUTOREFRACTION
OS_SPHERE: +0.50
OD_CYLINDER: -0.50
OS_AXIS: 67
OD_SPHERE: -0.25
OS_CYLINDER: -0.75
OD_AXIS: 100

## 2019-04-01 ASSESSMENT — REFRACTION_CURRENTRX
OD_OVR_VA: 20/
OS_OVR_VA: 20/
OS_OVR_VA: 20/
OD_OVR_VA: 20/
OS_OVR_VA: 20/
OD_OVR_VA: 20/

## 2019-04-01 ASSESSMENT — REFRACTION_MANIFEST
OD_VA3: 20/
OU_VA: 20/
OS_VA1: 20/
OD_VA1: 20/
OS_VA3: 20/
OS_VA2: 20/
OS_VA1: 20/
OD_VA1: 20/
OD_VA2: 20/
OD_VA3: 20/
OD_VA2: 20/
OU_VA: 20/
OS_VA3: 20/
OS_VA2: 20/

## 2019-04-01 ASSESSMENT — VISUAL ACUITY
OS_BCVA: 20/20-1
OD_BCVA: 20/20-1

## 2019-04-22 ENCOUNTER — DOCTOR'S OFFICE (OUTPATIENT)
Dept: URBAN - NONMETROPOLITAN AREA CLINIC 1 | Facility: CLINIC | Age: 64
Setting detail: OPHTHALMOLOGY
End: 2019-04-22
Payer: MEDICARE

## 2019-04-22 DIAGNOSIS — H34.8310: ICD-10-CM

## 2019-04-22 DIAGNOSIS — H43.813: ICD-10-CM

## 2019-04-22 DIAGNOSIS — H25.13: ICD-10-CM

## 2019-04-22 DIAGNOSIS — H16.223: ICD-10-CM

## 2019-04-22 DIAGNOSIS — H16.222: ICD-10-CM

## 2019-04-22 PROCEDURE — 83861 MICROFLUID ANALY TEARS: CPT | Performed by: OPHTHALMOLOGY

## 2019-04-22 PROCEDURE — 92134 CPTRZ OPH DX IMG PST SGM RTA: CPT | Performed by: OPHTHALMOLOGY

## 2019-04-22 PROCEDURE — 92014 COMPRE OPH EXAM EST PT 1/>: CPT | Performed by: OPHTHALMOLOGY

## 2019-04-22 ASSESSMENT — REFRACTION_MANIFEST
OS_VA3: 20/
OD_VA2: 20/
OD_VA1: 20/
OU_VA: 20/
OD_VA1: 20/
OS_VA3: 20/
OD_VA3: 20/
OS_VA1: 20/
OS_VA2: 20/
OD_VA2: 20/
OS_VA1: 20/
OD_VA3: 20/
OU_VA: 20/
OS_VA2: 20/

## 2019-04-22 ASSESSMENT — REFRACTION_CURRENTRX
OS_OVR_VA: 20/
OD_OVR_VA: 20/
OS_OVR_VA: 20/
OS_OVR_VA: 20/

## 2019-04-22 ASSESSMENT — LID EXAM ASSESSMENTS
OD_BLEPHARITIS: T
OS_BLEPHARITIS: T

## 2019-04-22 ASSESSMENT — VISUAL ACUITY
OS_BCVA: 20/20-1
OD_BCVA: 20/20-1

## 2019-04-22 ASSESSMENT — REFRACTION_AUTOREFRACTION
OS_AXIS: 67
OD_SPHERE: -0.25
OD_AXIS: 100
OD_CYLINDER: -0.50
OS_CYLINDER: -0.75
OS_SPHERE: +0.50

## 2019-04-22 ASSESSMENT — SPHEQUIV_DERIVED
OS_SPHEQUIV: 0.125
OD_SPHEQUIV: -0.5

## 2019-04-22 ASSESSMENT — TEAR BREAK UP TIME (TBUT)
OD_TBUT: T
OS_TBUT: T

## 2019-04-22 ASSESSMENT — CONFRONTATIONAL VISUAL FIELD TEST (CVF)
OD_FINDINGS: FULL
OS_FINDINGS: FULL

## 2019-05-20 ENCOUNTER — DOCTOR'S OFFICE (OUTPATIENT)
Dept: URBAN - NONMETROPOLITAN AREA CLINIC 1 | Facility: CLINIC | Age: 64
Setting detail: OPHTHALMOLOGY
End: 2019-05-20
Payer: MEDICARE

## 2019-05-20 DIAGNOSIS — H16.222: ICD-10-CM

## 2019-05-20 DIAGNOSIS — H16.223: ICD-10-CM

## 2019-05-20 DIAGNOSIS — H43.813: ICD-10-CM

## 2019-05-20 DIAGNOSIS — H25.13: ICD-10-CM

## 2019-05-20 DIAGNOSIS — H43.812: ICD-10-CM

## 2019-05-20 DIAGNOSIS — H43.811: ICD-10-CM

## 2019-05-20 DIAGNOSIS — H34.8310: ICD-10-CM

## 2019-05-20 PROCEDURE — 92134 CPTRZ OPH DX IMG PST SGM RTA: CPT | Performed by: OPHTHALMOLOGY

## 2019-05-20 PROCEDURE — 92014 COMPRE OPH EXAM EST PT 1/>: CPT | Performed by: OPHTHALMOLOGY

## 2019-05-20 PROCEDURE — 83861 MICROFLUID ANALY TEARS: CPT | Performed by: OPHTHALMOLOGY

## 2019-05-20 PROCEDURE — 92226 OPHTHALMOSCOPY EXT SUBSEQUENT: CPT | Performed by: OPHTHALMOLOGY

## 2019-05-20 ASSESSMENT — REFRACTION_CURRENTRX
OS_OVR_VA: 20/
OS_OVR_VA: 20/
OD_OVR_VA: 20/
OS_OVR_VA: 20/
OD_OVR_VA: 20/
OD_OVR_VA: 20/

## 2019-05-20 ASSESSMENT — TEAR BREAK UP TIME (TBUT)
OD_TBUT: T
OS_TBUT: T

## 2019-05-20 ASSESSMENT — REFRACTION_MANIFEST
OD_VA1: 20/
OS_VA2: 20/
OD_VA3: 20/
OS_VA2: 20/
OU_VA: 20/
OD_VA3: 20/
OS_VA1: 20/
OU_VA: 20/
OD_VA2: 20/
OD_VA2: 20/
OS_VA1: 20/
OS_VA3: 20/
OS_VA3: 20/
OD_VA1: 20/

## 2019-05-20 ASSESSMENT — LID EXAM ASSESSMENTS
OS_BLEPHARITIS: T
OD_BLEPHARITIS: T

## 2019-05-20 ASSESSMENT — SPHEQUIV_DERIVED
OD_SPHEQUIV: -0.5
OS_SPHEQUIV: 0.125

## 2019-05-20 ASSESSMENT — REFRACTION_AUTOREFRACTION
OD_AXIS: 100
OS_AXIS: 67
OD_SPHERE: -0.25
OS_CYLINDER: -0.75
OD_CYLINDER: -0.50
OS_SPHERE: +0.50

## 2019-05-20 ASSESSMENT — CONFRONTATIONAL VISUAL FIELD TEST (CVF)
OS_FINDINGS: FULL
OD_FINDINGS: FULL

## 2019-05-20 ASSESSMENT — VISUAL ACUITY
OD_BCVA: 20/20-1
OS_BCVA: 20/25-1

## 2019-06-03 ENCOUNTER — DOCTOR'S OFFICE (OUTPATIENT)
Dept: URBAN - NONMETROPOLITAN AREA CLINIC 1 | Facility: CLINIC | Age: 64
Setting detail: OPHTHALMOLOGY
End: 2019-06-03
Payer: MEDICARE

## 2019-06-03 DIAGNOSIS — H34.8310: ICD-10-CM

## 2019-06-03 PROCEDURE — 67028 INJECTION EYE DRUG: CPT | Performed by: OPHTHALMOLOGY

## 2019-06-03 ASSESSMENT — REFRACTION_MANIFEST
OD_VA1: 20/
OD_VA3: 20/
OS_VA1: 20/
OU_VA: 20/
OS_VA2: 20/
OS_VA3: 20/
OD_VA2: 20/
OD_VA3: 20/
OS_VA2: 20/
OD_VA1: 20/
OS_VA1: 20/
OD_VA2: 20/
OU_VA: 20/
OS_VA3: 20/

## 2019-06-03 ASSESSMENT — REFRACTION_CURRENTRX
OD_OVR_VA: 20/
OD_OVR_VA: 20/
OS_OVR_VA: 20/
OD_OVR_VA: 20/
OS_OVR_VA: 20/
OS_OVR_VA: 20/

## 2019-06-03 ASSESSMENT — REFRACTION_AUTOREFRACTION
OD_CYLINDER: -0.50
OS_AXIS: 67
OS_SPHERE: +0.50
OD_AXIS: 100
OD_SPHERE: -0.25
OS_CYLINDER: -0.75

## 2019-06-03 ASSESSMENT — SPHEQUIV_DERIVED
OD_SPHEQUIV: -0.5
OS_SPHEQUIV: 0.125

## 2019-06-03 ASSESSMENT — VISUAL ACUITY
OS_BCVA: 20/25-1
OD_BCVA: 20/20-1

## 2019-07-08 ENCOUNTER — DOCTOR'S OFFICE (OUTPATIENT)
Dept: URBAN - NONMETROPOLITAN AREA CLINIC 1 | Facility: CLINIC | Age: 64
Setting detail: OPHTHALMOLOGY
End: 2019-07-08
Payer: MEDICARE

## 2019-07-08 DIAGNOSIS — H43.811: ICD-10-CM

## 2019-07-08 DIAGNOSIS — H16.222: ICD-10-CM

## 2019-07-08 DIAGNOSIS — H43.812: ICD-10-CM

## 2019-07-08 DIAGNOSIS — H34.8310: ICD-10-CM

## 2019-07-08 DIAGNOSIS — H43.813: ICD-10-CM

## 2019-07-08 DIAGNOSIS — H16.223: ICD-10-CM

## 2019-07-08 DIAGNOSIS — H25.13: ICD-10-CM

## 2019-07-08 PROCEDURE — 83861 MICROFLUID ANALY TEARS: CPT | Performed by: OPHTHALMOLOGY

## 2019-07-08 PROCEDURE — 92134 CPTRZ OPH DX IMG PST SGM RTA: CPT | Performed by: OPHTHALMOLOGY

## 2019-07-08 PROCEDURE — 92226 OPHTHALMOSCOPY EXT SUBSEQUENT: CPT | Performed by: OPHTHALMOLOGY

## 2019-07-08 PROCEDURE — 92014 COMPRE OPH EXAM EST PT 1/>: CPT | Performed by: OPHTHALMOLOGY

## 2019-07-08 ASSESSMENT — REFRACTION_MANIFEST
OD_VA1: 20/
OD_VA2: 20/
OS_VA2: 20/
OD_VA1: 20/
OS_VA1: 20/
OS_VA3: 20/
OD_VA3: 20/
OS_VA1: 20/
OU_VA: 20/
OU_VA: 20/
OS_VA2: 20/
OD_VA2: 20/
OS_VA3: 20/
OD_VA3: 20/

## 2019-07-08 ASSESSMENT — TEAR BREAK UP TIME (TBUT)
OD_TBUT: T
OS_TBUT: T

## 2019-07-08 ASSESSMENT — REFRACTION_CURRENTRX
OD_OVR_VA: 20/
OD_OVR_VA: 20/
OS_OVR_VA: 20/
OS_OVR_VA: 20/
OD_OVR_VA: 20/
OS_OVR_VA: 20/

## 2019-07-08 ASSESSMENT — VISUAL ACUITY
OD_BCVA: 20/25
OS_BCVA: 20/25

## 2019-07-08 ASSESSMENT — SPHEQUIV_DERIVED
OD_SPHEQUIV: -0.5
OS_SPHEQUIV: 0.125

## 2019-07-08 ASSESSMENT — REFRACTION_AUTOREFRACTION
OS_CYLINDER: -0.75
OD_CYLINDER: -0.50
OS_SPHERE: +0.50
OD_SPHERE: -0.25
OD_AXIS: 100
OS_AXIS: 67

## 2019-07-08 ASSESSMENT — CONFRONTATIONAL VISUAL FIELD TEST (CVF)
OS_FINDINGS: FULL
OD_FINDINGS: FULL

## 2019-07-08 ASSESSMENT — LID EXAM ASSESSMENTS
OD_BLEPHARITIS: T
OS_BLEPHARITIS: T

## 2019-07-22 ENCOUNTER — DOCTOR'S OFFICE (OUTPATIENT)
Dept: URBAN - NONMETROPOLITAN AREA CLINIC 1 | Facility: CLINIC | Age: 64
Setting detail: OPHTHALMOLOGY
End: 2019-07-22
Payer: MEDICARE

## 2019-07-22 DIAGNOSIS — H34.8310: ICD-10-CM

## 2019-07-22 PROCEDURE — 92250 FUNDUS PHOTOGRAPHY W/I&R: CPT | Performed by: OPHTHALMOLOGY

## 2019-07-22 PROCEDURE — 67028 INJECTION EYE DRUG: CPT | Performed by: OPHTHALMOLOGY

## 2019-07-22 PROCEDURE — 92235 FLUORESCEIN ANGRPH MLTIFRAME: CPT | Performed by: OPHTHALMOLOGY

## 2019-07-22 ASSESSMENT — VISUAL ACUITY
OS_BCVA: 20/25
OD_BCVA: 20/25

## 2019-07-22 ASSESSMENT — REFRACTION_AUTOREFRACTION
OS_AXIS: 67
OD_AXIS: 100
OD_CYLINDER: -0.50
OS_CYLINDER: -0.75
OS_SPHERE: +0.50
OD_SPHERE: -0.25

## 2019-07-22 ASSESSMENT — SPHEQUIV_DERIVED
OS_SPHEQUIV: 0.125
OD_SPHEQUIV: -0.5

## 2019-07-22 ASSESSMENT — REFRACTION_MANIFEST
OD_VA1: 20/
OD_VA3: 20/
OD_VA2: 20/
OS_VA1: 20/
OS_VA2: 20/
OD_VA3: 20/
OU_VA: 20/
OD_VA1: 20/
OS_VA2: 20/
OS_VA1: 20/
OU_VA: 20/
OS_VA3: 20/
OD_VA2: 20/
OS_VA3: 20/

## 2019-07-22 ASSESSMENT — REFRACTION_CURRENTRX
OD_OVR_VA: 20/
OS_OVR_VA: 20/

## 2019-08-05 ENCOUNTER — DOCTOR'S OFFICE (OUTPATIENT)
Dept: URBAN - NONMETROPOLITAN AREA CLINIC 1 | Facility: CLINIC | Age: 64
Setting detail: OPHTHALMOLOGY
End: 2019-08-05
Payer: MEDICARE

## 2019-08-05 DIAGNOSIS — H16.223: ICD-10-CM

## 2019-08-05 DIAGNOSIS — H16.222: ICD-10-CM

## 2019-08-05 DIAGNOSIS — H25.13: ICD-10-CM

## 2019-08-05 DIAGNOSIS — H34.8310: ICD-10-CM

## 2019-08-05 DIAGNOSIS — H16.221: ICD-10-CM

## 2019-08-05 DIAGNOSIS — H43.813: ICD-10-CM

## 2019-08-05 PROCEDURE — 92014 COMPRE OPH EXAM EST PT 1/>: CPT | Performed by: OPHTHALMOLOGY

## 2019-08-05 PROCEDURE — 83861 MICROFLUID ANALY TEARS: CPT | Performed by: OPHTHALMOLOGY

## 2019-08-05 PROCEDURE — 92134 CPTRZ OPH DX IMG PST SGM RTA: CPT | Performed by: OPHTHALMOLOGY

## 2019-08-05 ASSESSMENT — REFRACTION_AUTOREFRACTION
OD_CYLINDER: -0.50
OD_SPHERE: -0.25
OD_AXIS: 100
OS_CYLINDER: -0.75
OS_AXIS: 67
OS_SPHERE: +0.50

## 2019-08-05 ASSESSMENT — TEAR BREAK UP TIME (TBUT)
OD_TBUT: T
OS_TBUT: T

## 2019-08-05 ASSESSMENT — REFRACTION_CURRENTRX
OS_OVR_VA: 20/
OS_OVR_VA: 20/
OD_OVR_VA: 20/
OS_OVR_VA: 20/
OD_OVR_VA: 20/
OD_OVR_VA: 20/

## 2019-08-05 ASSESSMENT — REFRACTION_MANIFEST
OS_VA3: 20/
OD_VA1: 20/
OD_VA2: 20/
OS_VA2: 20/
OU_VA: 20/
OD_VA3: 20/
OS_VA3: 20/
OS_VA1: 20/
OS_VA1: 20/
OD_VA1: 20/
OD_VA2: 20/
OD_VA3: 20/
OS_VA2: 20/
OU_VA: 20/

## 2019-08-05 ASSESSMENT — SPHEQUIV_DERIVED
OS_SPHEQUIV: 0.125
OD_SPHEQUIV: -0.5

## 2019-08-05 ASSESSMENT — CONFRONTATIONAL VISUAL FIELD TEST (CVF)
OS_FINDINGS: FULL
OD_FINDINGS: FULL

## 2019-08-05 ASSESSMENT — LID EXAM ASSESSMENTS
OS_BLEPHARITIS: T
OD_BLEPHARITIS: T

## 2019-08-05 ASSESSMENT — VISUAL ACUITY
OS_BCVA: 20/30-2
OD_BCVA: 20/25+2

## 2019-08-26 ENCOUNTER — DOCTOR'S OFFICE (OUTPATIENT)
Dept: URBAN - NONMETROPOLITAN AREA CLINIC 1 | Facility: CLINIC | Age: 64
Setting detail: OPHTHALMOLOGY
End: 2019-08-26
Payer: MEDICARE

## 2019-08-26 DIAGNOSIS — H34.8310: ICD-10-CM

## 2019-08-26 PROCEDURE — 67028 INJECTION EYE DRUG: CPT | Performed by: OPHTHALMOLOGY

## 2019-08-27 ENCOUNTER — RX ONLY (RX ONLY)
Age: 64
End: 2019-08-27

## 2019-08-27 ASSESSMENT — REFRACTION_AUTOREFRACTION
OS_CYLINDER: -0.75
OD_AXIS: 100
OD_CYLINDER: -0.50
OS_SPHERE: +0.50
OS_AXIS: 67
OD_SPHERE: -0.25

## 2019-08-27 ASSESSMENT — REFRACTION_MANIFEST
OS_VA1: 20/
OD_VA1: 20/
OU_VA: 20/
OD_VA1: 20/
OS_VA3: 20/
OU_VA: 20/
OD_VA3: 20/
OD_VA2: 20/
OD_VA3: 20/
OS_VA1: 20/
OS_VA2: 20/
OS_VA3: 20/
OD_VA2: 20/
OS_VA2: 20/

## 2019-08-27 ASSESSMENT — REFRACTION_CURRENTRX
OD_OVR_VA: 20/
OS_OVR_VA: 20/
OS_OVR_VA: 20/
OD_OVR_VA: 20/
OS_OVR_VA: 20/
OD_OVR_VA: 20/

## 2019-08-27 ASSESSMENT — SPHEQUIV_DERIVED
OD_SPHEQUIV: -0.5
OS_SPHEQUIV: 0.125

## 2019-08-27 ASSESSMENT — VISUAL ACUITY
OD_BCVA: 20/25+2
OS_BCVA: 20/30-2

## 2019-09-16 ENCOUNTER — DOCTOR'S OFFICE (OUTPATIENT)
Dept: URBAN - NONMETROPOLITAN AREA CLINIC 1 | Facility: CLINIC | Age: 64
Setting detail: OPHTHALMOLOGY
End: 2019-09-16
Payer: MEDICARE

## 2019-09-16 DIAGNOSIS — H43.813: ICD-10-CM

## 2019-09-16 DIAGNOSIS — H16.223: ICD-10-CM

## 2019-09-16 DIAGNOSIS — H43.811: ICD-10-CM

## 2019-09-16 DIAGNOSIS — H43.812: ICD-10-CM

## 2019-09-16 DIAGNOSIS — H34.8310: ICD-10-CM

## 2019-09-16 DIAGNOSIS — H25.13: ICD-10-CM

## 2019-09-16 PROCEDURE — 92134 CPTRZ OPH DX IMG PST SGM RTA: CPT | Performed by: OPHTHALMOLOGY

## 2019-09-16 PROCEDURE — 92014 COMPRE OPH EXAM EST PT 1/>: CPT | Performed by: OPHTHALMOLOGY

## 2019-09-16 PROCEDURE — 92226 OPHTHALMOSCOPY EXT SUBSEQUENT: CPT | Performed by: OPHTHALMOLOGY

## 2019-09-16 ASSESSMENT — REFRACTION_MANIFEST
OU_VA: 20/
OS_VA3: 20/
OU_VA: 20/
OD_VA2: 20/
OS_VA3: 20/
OS_VA1: 20/
OD_VA3: 20/
OS_VA2: 20/
OD_VA1: 20/
OS_VA1: 20/
OD_VA3: 20/
OS_VA2: 20/
OD_VA2: 20/
OD_VA1: 20/

## 2019-09-16 ASSESSMENT — REFRACTION_AUTOREFRACTION
OD_AXIS: 100
OS_SPHERE: +0.50
OS_CYLINDER: -0.75
OD_CYLINDER: -0.50
OS_AXIS: 67
OD_SPHERE: -0.25

## 2019-09-16 ASSESSMENT — REFRACTION_CURRENTRX
OD_OVR_VA: 20/
OD_OVR_VA: 20/
OS_OVR_VA: 20/
OS_OVR_VA: 20/
OD_OVR_VA: 20/
OS_OVR_VA: 20/

## 2019-09-16 ASSESSMENT — CONFRONTATIONAL VISUAL FIELD TEST (CVF)
OD_FINDINGS: FULL
OS_FINDINGS: FULL

## 2019-09-16 ASSESSMENT — LID EXAM ASSESSMENTS
OD_BLEPHARITIS: T
OS_BLEPHARITIS: T

## 2019-09-16 ASSESSMENT — VISUAL ACUITY
OS_BCVA: 20/20-2
OD_BCVA: 20/25-2

## 2019-09-16 ASSESSMENT — SPHEQUIV_DERIVED
OS_SPHEQUIV: 0.125
OD_SPHEQUIV: -0.5

## 2019-09-16 ASSESSMENT — TEAR BREAK UP TIME (TBUT)
OS_TBUT: T
OD_TBUT: T

## 2019-09-30 ENCOUNTER — DOCTOR'S OFFICE (OUTPATIENT)
Dept: URBAN - NONMETROPOLITAN AREA CLINIC 1 | Facility: CLINIC | Age: 64
Setting detail: OPHTHALMOLOGY
End: 2019-09-30
Payer: MEDICARE

## 2019-09-30 ENCOUNTER — RX ONLY (RX ONLY)
Age: 64
End: 2019-09-30

## 2019-09-30 DIAGNOSIS — H34.8310: ICD-10-CM

## 2019-09-30 PROCEDURE — 67028 INJECTION EYE DRUG: CPT | Performed by: OPHTHALMOLOGY

## 2019-09-30 ASSESSMENT — REFRACTION_MANIFEST
OU_VA: 20/
OD_VA3: 20/
OD_VA1: 20/
OD_VA3: 20/
OS_VA2: 20/
OS_VA2: 20/
OS_VA3: 20/
OS_VA1: 20/
OD_VA2: 20/
OD_VA1: 20/
OU_VA: 20/
OD_VA2: 20/
OS_VA1: 20/
OS_VA3: 20/

## 2019-09-30 ASSESSMENT — REFRACTION_CURRENTRX
OS_OVR_VA: 20/
OD_OVR_VA: 20/
OS_OVR_VA: 20/
OD_OVR_VA: 20/
OD_OVR_VA: 20/
OS_OVR_VA: 20/

## 2019-09-30 ASSESSMENT — REFRACTION_AUTOREFRACTION
OS_CYLINDER: -0.75
OD_AXIS: 100
OD_SPHERE: -0.25
OD_CYLINDER: -0.50
OS_AXIS: 67
OS_SPHERE: +0.50

## 2019-09-30 ASSESSMENT — VISUAL ACUITY
OD_BCVA: 20/25-2
OS_BCVA: 20/20-2

## 2019-09-30 ASSESSMENT — SPHEQUIV_DERIVED
OS_SPHEQUIV: 0.125
OD_SPHEQUIV: -0.5

## 2019-10-14 ENCOUNTER — DOCTOR'S OFFICE (OUTPATIENT)
Dept: URBAN - NONMETROPOLITAN AREA CLINIC 1 | Facility: CLINIC | Age: 64
Setting detail: OPHTHALMOLOGY
End: 2019-10-14
Payer: MEDICARE

## 2019-10-14 DIAGNOSIS — H34.8310: ICD-10-CM

## 2019-10-14 DIAGNOSIS — H43.812: ICD-10-CM

## 2019-10-14 DIAGNOSIS — H25.13: ICD-10-CM

## 2019-10-14 DIAGNOSIS — H43.813: ICD-10-CM

## 2019-10-14 DIAGNOSIS — H16.223: ICD-10-CM

## 2019-10-14 DIAGNOSIS — H43.811: ICD-10-CM

## 2019-10-14 PROCEDURE — 92226 OPHTHALMOSCOPY EXT SUBSEQUENT: CPT | Performed by: OPHTHALMOLOGY

## 2019-10-14 PROCEDURE — 92014 COMPRE OPH EXAM EST PT 1/>: CPT | Performed by: OPHTHALMOLOGY

## 2019-10-14 PROCEDURE — 92134 CPTRZ OPH DX IMG PST SGM RTA: CPT | Performed by: OPHTHALMOLOGY

## 2019-10-14 ASSESSMENT — REFRACTION_MANIFEST
OS_VA3: 20/
OU_VA: 20/
OD_VA2: 20/
OD_VA1: 20/
OU_VA: 20/
OD_VA3: 20/
OS_VA1: 20/
OD_VA3: 20/
OD_VA1: 20/
OD_VA2: 20/
OS_VA1: 20/
OS_VA2: 20/
OS_VA2: 20/
OS_VA3: 20/

## 2019-10-14 ASSESSMENT — REFRACTION_AUTOREFRACTION
OS_AXIS: 67
OD_AXIS: 100
OS_CYLINDER: -0.75
OD_CYLINDER: -0.50
OS_SPHERE: +0.50
OD_SPHERE: -0.25

## 2019-10-14 ASSESSMENT — VISUAL ACUITY
OS_BCVA: 20/20-1
OD_BCVA: 20/20-1

## 2019-10-14 ASSESSMENT — REFRACTION_CURRENTRX
OS_OVR_VA: 20/
OD_OVR_VA: 20/
OD_OVR_VA: 20/
OS_OVR_VA: 20/
OS_OVR_VA: 20/
OD_OVR_VA: 20/

## 2019-10-14 ASSESSMENT — CONFRONTATIONAL VISUAL FIELD TEST (CVF)
OD_FINDINGS: FULL
OS_FINDINGS: FULL

## 2019-10-14 ASSESSMENT — TEAR BREAK UP TIME (TBUT)
OS_TBUT: T
OD_TBUT: T

## 2019-10-14 ASSESSMENT — SPHEQUIV_DERIVED
OD_SPHEQUIV: -0.5
OS_SPHEQUIV: 0.125

## 2019-10-14 ASSESSMENT — LID EXAM ASSESSMENTS
OS_BLEPHARITIS: T
OD_BLEPHARITIS: T

## 2019-11-11 ENCOUNTER — DOCTOR'S OFFICE (OUTPATIENT)
Dept: URBAN - NONMETROPOLITAN AREA CLINIC 1 | Facility: CLINIC | Age: 64
Setting detail: OPHTHALMOLOGY
End: 2019-11-11
Payer: MEDICARE

## 2019-11-11 DIAGNOSIS — H43.813: ICD-10-CM

## 2019-11-11 DIAGNOSIS — H34.8310: ICD-10-CM

## 2019-11-11 DIAGNOSIS — H25.13: ICD-10-CM

## 2019-11-11 DIAGNOSIS — H16.223: ICD-10-CM

## 2019-11-11 PROCEDURE — 92014 COMPRE OPH EXAM EST PT 1/>: CPT | Performed by: OPHTHALMOLOGY

## 2019-11-11 PROCEDURE — 92134 CPTRZ OPH DX IMG PST SGM RTA: CPT | Performed by: OPHTHALMOLOGY

## 2019-11-11 ASSESSMENT — TEAR BREAK UP TIME (TBUT)
OD_TBUT: T
OS_TBUT: T

## 2019-11-11 ASSESSMENT — REFRACTION_MANIFEST
OU_VA: 20/
OU_VA: 20/
OD_VA1: 20/
OD_VA1: 20/
OS_VA2: 20/
OD_VA3: 20/
OS_VA3: 20/
OD_VA2: 20/
OS_VA1: 20/
OS_VA3: 20/
OS_VA1: 20/
OD_VA3: 20/
OS_VA2: 20/
OD_VA2: 20/

## 2019-11-11 ASSESSMENT — REFRACTION_CURRENTRX
OD_OVR_VA: 20/
OS_OVR_VA: 20/
OD_OVR_VA: 20/
OD_OVR_VA: 20/

## 2019-11-11 ASSESSMENT — LID EXAM ASSESSMENTS
OS_BLEPHARITIS: T
OD_BLEPHARITIS: T

## 2019-11-11 ASSESSMENT — REFRACTION_AUTOREFRACTION
OD_CYLINDER: -0.50
OD_AXIS: 100
OS_AXIS: 67
OS_SPHERE: +0.50
OD_SPHERE: -0.25
OS_CYLINDER: -0.75

## 2019-11-11 ASSESSMENT — VISUAL ACUITY
OS_BCVA: 20/25+1
OD_BCVA: 20/20-2

## 2019-11-11 ASSESSMENT — CONFRONTATIONAL VISUAL FIELD TEST (CVF)
OD_FINDINGS: FULL
OS_FINDINGS: FULL

## 2019-11-11 ASSESSMENT — SPHEQUIV_DERIVED
OS_SPHEQUIV: 0.125
OD_SPHEQUIV: -0.5

## 2019-11-25 ENCOUNTER — DOCTOR'S OFFICE (OUTPATIENT)
Dept: URBAN - NONMETROPOLITAN AREA CLINIC 1 | Facility: CLINIC | Age: 64
Setting detail: OPHTHALMOLOGY
End: 2019-11-25
Payer: MEDICARE

## 2019-11-25 DIAGNOSIS — H34.8310: ICD-10-CM

## 2019-11-25 PROCEDURE — 92235 FLUORESCEIN ANGRPH MLTIFRAME: CPT | Performed by: OPHTHALMOLOGY

## 2019-11-25 PROCEDURE — 67028 INJECTION EYE DRUG: CPT | Performed by: OPHTHALMOLOGY

## 2019-11-25 PROCEDURE — 92250 FUNDUS PHOTOGRAPHY W/I&R: CPT | Performed by: OPHTHALMOLOGY

## 2019-12-02 ENCOUNTER — DOCTOR'S OFFICE (OUTPATIENT)
Dept: URBAN - NONMETROPOLITAN AREA CLINIC 1 | Facility: CLINIC | Age: 64
Setting detail: OPHTHALMOLOGY
End: 2019-12-02
Payer: COMMERCIAL

## 2019-12-02 ENCOUNTER — RX ONLY (RX ONLY)
Age: 64
End: 2019-12-02

## 2019-12-02 ENCOUNTER — OPTICAL OFFICE (OUTPATIENT)
Dept: URBAN - NONMETROPOLITAN AREA CLINIC 4 | Facility: CLINIC | Age: 64
Setting detail: OPHTHALMOLOGY
End: 2019-12-02
Payer: COMMERCIAL

## 2019-12-02 DIAGNOSIS — H52.4: ICD-10-CM

## 2019-12-02 DIAGNOSIS — H52.223: ICD-10-CM

## 2019-12-02 PROCEDURE — V2020 VISION SVCS FRAMES PURCHASES: HCPCS | Performed by: OPTOMETRIST

## 2019-12-02 PROCEDURE — 92015 DETERMINE REFRACTIVE STATE: CPT | Performed by: OPTOMETRIST

## 2019-12-02 PROCEDURE — V2781 PROGRESSIVE LENS PER LENS: HCPCS | Performed by: OPTOMETRIST

## 2019-12-02 PROCEDURE — V2750 ANTI-REFLECTIVE COATING: HCPCS | Performed by: OPTOMETRIST

## 2019-12-02 ASSESSMENT — REFRACTION_CURRENTRX
OD_AXIS: 074
OD_OVR_VA: 20/
OD_OVR_VA: 20/
OD_SPHERE: +1.50
OS_VPRISM_DIRECTION: SV
OD_VPRISM_DIRECTION: SV
OS_CYLINDER: -1.75
OD_CYLINDER: -2.25
OS_OVR_VA: 20/
OD_OVR_VA: 20/
OS_OVR_VA: 20/
OS_SPHERE: +1.50
OS_AXIS: 092
OS_OVR_VA: 20/

## 2019-12-02 ASSESSMENT — REFRACTION_MANIFEST
OS_VA3: 20/
OS_SPHERE: +0.75
OD_VA2: 20/20-2
OS_VA2: 20/
OU_VA: 20/
OD_CYLINDER: -1.50
OD_VA1: 20/20-2
OS_VA1: 20/20-2
OD_VA3: 20/
OS_VA3: 20/
OD_VA1: 20/
OS_AXIS: 075
OD_VA2: 20/
OD_SPHERE: +0.25
OD_VA3: 20/
OS_VA3: 20/
OD_AXIS: 100
OD_VA3: 20/
OS_VA1: 20/
OS_CYLINDER: -1.00
OS_VA3: 20/
OS_VA1: 20/
OS_VA2: 20/
OU_VA: 20/
OS_VA2: 20/
OU_VA: 20/
OU_VA: 20/
OS_ADD: +2.50
OD_VA3: 20/
OD_VA2: 20/
OS_VA2: 20/20-2
OD_VA1: 20/
OD_VA2: 20/
OD_ADD: +2.50
OD_VA1: 20/
OS_VA1: 20/

## 2019-12-02 ASSESSMENT — CONFRONTATIONAL VISUAL FIELD TEST (CVF)
OD_FINDINGS: FULL
OS_FINDINGS: FULL

## 2019-12-02 ASSESSMENT — REFRACTION_AUTOREFRACTION
OD_SPHERE: +0.25
OS_AXIS: 60
OD_AXIS: 86
OS_SPHERE: +1.00
OS_CYLINDER: -1.25
OD_CYLINDER: -0.75

## 2019-12-02 ASSESSMENT — SPHEQUIV_DERIVED
OD_SPHEQUIV: -0.125
OD_SPHEQUIV: -0.5
OS_SPHEQUIV: 0.375
OS_SPHEQUIV: 0.25

## 2019-12-02 ASSESSMENT — VISUAL ACUITY
OS_BCVA: 20/20
OD_BCVA: 20/20-1

## 2019-12-09 ENCOUNTER — DOCTOR'S OFFICE (OUTPATIENT)
Dept: URBAN - NONMETROPOLITAN AREA CLINIC 1 | Facility: CLINIC | Age: 64
Setting detail: OPHTHALMOLOGY
End: 2019-12-09
Payer: MEDICARE

## 2019-12-09 DIAGNOSIS — H43.813: ICD-10-CM

## 2019-12-09 DIAGNOSIS — H25.13: ICD-10-CM

## 2019-12-09 DIAGNOSIS — H34.8310: ICD-10-CM

## 2019-12-09 DIAGNOSIS — H16.223: ICD-10-CM

## 2019-12-09 PROCEDURE — 92134 CPTRZ OPH DX IMG PST SGM RTA: CPT | Performed by: OPHTHALMOLOGY

## 2019-12-09 PROCEDURE — 92014 COMPRE OPH EXAM EST PT 1/>: CPT | Performed by: OPHTHALMOLOGY

## 2019-12-09 ASSESSMENT — REFRACTION_MANIFEST
OS_VA3: 20/
OU_VA: 20/
OS_VA2: 20/20-2
OD_CYLINDER: -1.50
OD_VA1: 20/
OD_VA3: 20/
OS_VA2: 20/
OD_ADD: +2.50
OD_SPHERE: +0.25
OD_AXIS: 100
OS_VA3: 20/
OD_VA1: 20/20-2
OD_VA2: 20/
OS_CYLINDER: -1.00
OS_VA1: 20/20-2
OS_ADD: +2.50
OD_VA3: 20/
OD_VA2: 20/20-2
OS_SPHERE: +0.75
OS_VA1: 20/
OS_AXIS: 075
OU_VA: 20/

## 2019-12-09 ASSESSMENT — SPHEQUIV_DERIVED
OD_SPHEQUIV: -0.125
OD_SPHEQUIV: -0.5
OS_SPHEQUIV: 0.25
OS_SPHEQUIV: 0.375

## 2019-12-09 ASSESSMENT — REFRACTION_CURRENTRX
OD_CYLINDER: -2.25
OD_SPHERE: +1.50
OD_OVR_VA: 20/
OD_AXIS: 074
OD_OVR_VA: 20/
OS_VPRISM_DIRECTION: SV
OS_CYLINDER: -1.75
OS_OVR_VA: 20/
OS_OVR_VA: 20/
OD_OVR_VA: 20/
OS_AXIS: 092
OS_SPHERE: +1.50
OS_OVR_VA: 20/
OD_VPRISM_DIRECTION: SV

## 2019-12-09 ASSESSMENT — REFRACTION_AUTOREFRACTION
OD_AXIS: 86
OS_CYLINDER: -1.25
OS_SPHERE: +1.00
OD_CYLINDER: -0.75
OS_AXIS: 60
OD_SPHERE: +0.25

## 2019-12-09 ASSESSMENT — VISUAL ACUITY
OS_BCVA: 20/20
OD_BCVA: 20/20-1

## 2019-12-09 ASSESSMENT — CONFRONTATIONAL VISUAL FIELD TEST (CVF)
OS_FINDINGS: FULL
OD_FINDINGS: FULL

## 2019-12-09 ASSESSMENT — TEAR BREAK UP TIME (TBUT)
OD_TBUT: T
OS_TBUT: T

## 2019-12-09 ASSESSMENT — LID EXAM ASSESSMENTS
OS_BLEPHARITIS: T
OD_BLEPHARITIS: T

## 2020-01-06 ENCOUNTER — DOCTOR'S OFFICE (OUTPATIENT)
Dept: URBAN - NONMETROPOLITAN AREA CLINIC 1 | Facility: CLINIC | Age: 65
Setting detail: OPHTHALMOLOGY
End: 2020-01-06
Payer: MEDICARE

## 2020-01-06 DIAGNOSIS — H43.813: ICD-10-CM

## 2020-01-06 DIAGNOSIS — H34.8310: ICD-10-CM

## 2020-01-06 DIAGNOSIS — H25.13: ICD-10-CM

## 2020-01-06 PROCEDURE — 92134 CPTRZ OPH DX IMG PST SGM RTA: CPT | Performed by: OPHTHALMOLOGY

## 2020-01-06 PROCEDURE — 92201 OPSCPY EXTND RTA DRAW UNI/BI: CPT | Performed by: OPHTHALMOLOGY

## 2020-01-06 PROCEDURE — 92014 COMPRE OPH EXAM EST PT 1/>: CPT | Performed by: OPHTHALMOLOGY

## 2020-01-06 ASSESSMENT — REFRACTION_CURRENTRX
OS_OVR_VA: 20/
OD_OVR_VA: 20/
OS_OVR_VA: 20/
OD_OVR_VA: 20/
OD_OVR_VA: 20/
OD_VPRISM_DIRECTION: SV
OD_CYLINDER: -2.25
OD_AXIS: 074
OS_AXIS: 092
OS_SPHERE: +1.50
OD_SPHERE: +1.50
OS_CYLINDER: -1.75
OS_OVR_VA: 20/
OD_OVR_VA: 20/
OS_VPRISM_DIRECTION: SV
OS_OVR_VA: 20/

## 2020-01-06 ASSESSMENT — REFRACTION_AUTOREFRACTION
OS_AXIS: 67
OD_SPHERE: +0.25
OD_SPHERE: -0.25
OS_CYLINDER: -1.25
OD_AXIS: 86
OS_SPHERE: +0.50
OS_CYLINDER: -0.75
OS_AXIS: 60
OD_CYLINDER: -0.50
OD_AXIS: 100
OS_SPHERE: +1.00
OD_CYLINDER: -0.75

## 2020-01-06 ASSESSMENT — CONFRONTATIONAL VISUAL FIELD TEST (CVF)
OD_FINDINGS: FULL
OS_FINDINGS: FULL

## 2020-01-06 ASSESSMENT — REFRACTION_MANIFEST
OS_VA2: 20/20-2
OD_VA1: 20/20-2
OS_VA3: 20/
OS_VA1: 20/20-2
OD_ADD: +2.50
OS_ADD: +2.50
OD_CYLINDER: -1.50
OU_VA: 20/
OD_SPHERE: +0.25
OS_CYLINDER: -1.00
OD_VA3: 20/
OD_AXIS: 100
OD_VA2: 20/20-2
OS_SPHERE: +0.75
OS_AXIS: 075

## 2020-01-06 ASSESSMENT — VISUAL ACUITY
OD_BCVA: 20/20-2
OD_BCVA: 20/20
OS_BCVA: 20/20+1
OS_BCVA: 20/25+1

## 2020-01-06 ASSESSMENT — TEAR BREAK UP TIME (TBUT)
OS_TBUT: T
OD_TBUT: T

## 2020-01-06 ASSESSMENT — LID EXAM ASSESSMENTS
OS_BLEPHARITIS: T
OD_BLEPHARITIS: T

## 2020-01-06 ASSESSMENT — SPHEQUIV_DERIVED
OD_SPHEQUIV: -0.5
OD_SPHEQUIV: -0.125
OS_SPHEQUIV: 0.25
OS_SPHEQUIV: 0.125
OS_SPHEQUIV: 0.375
OD_SPHEQUIV: -0.5

## 2020-01-20 ENCOUNTER — DOCTOR'S OFFICE (OUTPATIENT)
Dept: URBAN - NONMETROPOLITAN AREA CLINIC 1 | Facility: CLINIC | Age: 65
Setting detail: OPHTHALMOLOGY
End: 2020-01-20
Payer: MEDICARE

## 2020-01-20 DIAGNOSIS — H34.8310: ICD-10-CM

## 2020-01-20 PROCEDURE — 67028 INJECTION EYE DRUG: CPT | Performed by: OPHTHALMOLOGY

## 2020-01-20 ASSESSMENT — REFRACTION_MANIFEST
OD_AXIS: 100
OS_VA3: 20/
OS_ADD: +2.50
OD_VA2: 20/20-2
OS_CYLINDER: -1.00
OU_VA: 20/
OD_VA3: 20/
OS_VA1: 20/20-2
OS_VA2: 20/20-2
OS_AXIS: 075
OD_VA1: 20/20-2
OD_SPHERE: +0.25
OD_CYLINDER: -1.50
OD_ADD: +2.50
OS_SPHERE: +0.75

## 2020-01-20 ASSESSMENT — VISUAL ACUITY
OD_BCVA: 20/20
OS_BCVA: 20/20+1

## 2020-01-20 ASSESSMENT — REFRACTION_AUTOREFRACTION
OS_AXIS: 60
OS_CYLINDER: -1.25
OD_SPHERE: +0.25
OD_CYLINDER: -0.75
OD_AXIS: 86
OS_SPHERE: +1.00

## 2020-01-20 ASSESSMENT — REFRACTION_CURRENTRX
OD_OVR_VA: 20/
OS_CYLINDER: -1.75
OS_AXIS: 092
OS_VPRISM_DIRECTION: SV
OD_SPHERE: +1.50
OD_CYLINDER: -2.25
OS_SPHERE: +1.50
OS_OVR_VA: 20/
OD_AXIS: 074
OD_VPRISM_DIRECTION: SV

## 2020-01-20 ASSESSMENT — SPHEQUIV_DERIVED
OS_SPHEQUIV: 0.375
OS_SPHEQUIV: 0.25
OD_SPHEQUIV: -0.125
OD_SPHEQUIV: -0.5

## 2020-01-27 ENCOUNTER — OFFICE VISIT (OUTPATIENT)
Dept: FAMILY MEDICINE CLINIC | Facility: CLINIC | Age: 65
End: 2020-01-27
Payer: COMMERCIAL

## 2020-01-27 VITALS
SYSTOLIC BLOOD PRESSURE: 132 MMHG | WEIGHT: 283 LBS | HEIGHT: 70 IN | BODY MASS INDEX: 40.52 KG/M2 | DIASTOLIC BLOOD PRESSURE: 80 MMHG

## 2020-01-27 DIAGNOSIS — Z23 NEEDS FLU SHOT: ICD-10-CM

## 2020-01-27 DIAGNOSIS — Z12.11 SCREENING FOR MALIGNANT NEOPLASM OF COLON: ICD-10-CM

## 2020-01-27 DIAGNOSIS — G47.33 OBSTRUCTIVE SLEEP APNEA SYNDROME: ICD-10-CM

## 2020-01-27 DIAGNOSIS — M1A.09X0 IDIOPATHIC CHRONIC GOUT OF MULTIPLE SITES WITHOUT TOPHUS: Primary | ICD-10-CM

## 2020-01-27 DIAGNOSIS — Z00.00 WELLNESS EXAMINATION: ICD-10-CM

## 2020-01-27 PROCEDURE — 99213 OFFICE O/P EST LOW 20 MIN: CPT | Performed by: FAMILY MEDICINE

## 2020-01-27 PROCEDURE — 90471 IMMUNIZATION ADMIN: CPT

## 2020-01-27 PROCEDURE — 1036F TOBACCO NON-USER: CPT | Performed by: FAMILY MEDICINE

## 2020-01-27 PROCEDURE — 90682 RIV4 VACC RECOMBINANT DNA IM: CPT

## 2020-01-27 PROCEDURE — 3008F BODY MASS INDEX DOCD: CPT | Performed by: FAMILY MEDICINE

## 2020-01-27 RX ORDER — ALLOPURINOL 300 MG/1
300 TABLET ORAL DAILY
Qty: 90 TABLET | Refills: 3 | Status: SHIPPED | OUTPATIENT
Start: 2020-01-27

## 2020-01-27 RX ORDER — BRIMONIDINE TARTRATE 2 MG/ML
SOLUTION/ DROPS OPHTHALMIC
COMMUNITY
Start: 2020-01-08

## 2020-01-27 RX ORDER — ALLOPURINOL 300 MG/1
1 TABLET ORAL DAILY
COMMUNITY
Start: 2019-12-26 | End: 2020-01-27 | Stop reason: SDUPTHER

## 2020-01-27 NOTE — PROGRESS NOTES
Assessment/Plan:    Idiopathic chronic gout of multiple sites without tophus  Will recheck uric acid level continue baseline meds    Obstructive sleep apnea syndrome  Overall doing well continue nightly CPAP use       Diagnoses and all orders for this visit:    Idiopathic chronic gout of multiple sites without tophus  -     allopurinol (ZYLOPRIM) 300 mg tablet; Take 1 tablet (300 mg total) by mouth daily  -     Uric acid; Future    Obstructive sleep apnea syndrome    Screening for malignant neoplasm of colon  -     Ambulatory referral to Gastroenterology; Future    Wellness examination  Comments: Will check renal and lipid panel for wellness check  Orders:  -     Renal function panel; Future  -     Lipid panel; Future    Other orders  -     Discontinue: allopurinol (ZYLOPRIM) 300 mg tablet; Take 1 tablet by mouth daily  -     brimonidine tartrate 0 2 % ophthalmic solution          Subjective:      Patient ID: Kristofer Mcguire is a 60 y o  male  Patient has history of obstructive sleep apnea and gout overall has been doing well but has not been watching diet closely  Has not had any problems with gout      The following portions of the patient's history were reviewed and updated as appropriate: allergies, current medications, past medical history, past social history and problem list BMI Counseling: Body mass index is 41 19 kg/m²  The BMI is above normal  Nutrition recommendations include moderation in carbohydrate intake       Review of Systems   Constitutional: Negative for activity change, appetite change, chills, fatigue, fever and unexpected weight change  HENT: Negative for congestion, rhinorrhea, trouble swallowing and voice change  Eyes: Negative for visual disturbance  Respiratory: Negative for apnea, cough, chest tightness and shortness of breath  Cardiovascular: Negative for chest pain, palpitations and leg swelling     Gastrointestinal: Negative for abdominal distention, abdominal pain, constipation and diarrhea  Endocrine: Negative for polyuria ( nocturia times 0 to)  Genitourinary: Negative for difficulty urinating  Musculoskeletal: Positive for arthralgias (With intermittent knee pain)  Negative for myalgias  Skin: Negative for rash  Allergic/Immunologic: Negative for environmental allergies  Neurological: Negative for dizziness, weakness, light-headedness, numbness and headaches  Hematological: Negative for adenopathy  Psychiatric/Behavioral: Negative for agitation  Objective:      /80 (BP Location: Right arm, Patient Position: Sitting, Cuff Size: Large)   Ht 5' 9 5" (1 765 m)   Wt 128 kg (283 lb)   BMI 41 19 kg/m²          Physical Exam   Constitutional: He is oriented to person, place, and time  He appears well-developed and well-nourished  No distress  HENT:   Head: Normocephalic  Nose: Nose normal    Mouth/Throat: Oropharynx is clear and moist    Eyes: Conjunctivae are normal    Neck: Neck supple  No thyromegaly present  Cardiovascular: Normal rate, regular rhythm and intact distal pulses  Murmur heard  Pulmonary/Chest: Effort normal and breath sounds normal    Abdominal: Soft  He exhibits no distension and no mass  There is no tenderness  Musculoskeletal: He exhibits no edema  Lymphadenopathy:     He has no cervical adenopathy  Neurological: He is alert and oriented to person, place, and time  He displays abnormal reflex (Reflexes 1+)  Coordination normal    Skin: Skin is warm and dry  No rash noted  Psychiatric: He has a normal mood and affect  Nursing note and vitals reviewed

## 2020-01-27 NOTE — PATIENT INSTRUCTIONS
Overall seems to be doing well was recommended to watch starch in diet and limit to 1 medium serving per meal   Should also push some gentle walking activity    Will give flu shot today and do the renal and lipid profile for wellness check as well as check uric acid for history of gout

## 2020-02-03 ENCOUNTER — DOCTOR'S OFFICE (OUTPATIENT)
Dept: URBAN - NONMETROPOLITAN AREA CLINIC 1 | Facility: CLINIC | Age: 65
Setting detail: OPHTHALMOLOGY
End: 2020-02-03
Payer: MEDICARE

## 2020-02-03 DIAGNOSIS — H43.813: ICD-10-CM

## 2020-02-03 DIAGNOSIS — H34.8310: ICD-10-CM

## 2020-02-03 DIAGNOSIS — H25.13: ICD-10-CM

## 2020-02-03 DIAGNOSIS — H16.223: ICD-10-CM

## 2020-02-03 PROCEDURE — 92014 COMPRE OPH EXAM EST PT 1/>: CPT | Performed by: OPHTHALMOLOGY

## 2020-02-03 PROCEDURE — 92134 CPTRZ OPH DX IMG PST SGM RTA: CPT | Performed by: OPHTHALMOLOGY

## 2020-02-03 ASSESSMENT — REFRACTION_CURRENTRX
OD_OVR_VA: 20/
OD_CYLINDER: -2.25
OS_VPRISM_DIRECTION: SV
OS_CYLINDER: -1.75
OD_SPHERE: +1.50
OS_OVR_VA: 20/
OD_AXIS: 074
OS_AXIS: 092
OD_VPRISM_DIRECTION: SV
OS_SPHERE: +1.50

## 2020-02-03 ASSESSMENT — REFRACTION_AUTOREFRACTION
OD_SPHERE: +0.25
OS_AXIS: 60
OD_CYLINDER: -0.75
OD_AXIS: 86
OS_CYLINDER: -1.25
OS_SPHERE: +1.00

## 2020-02-03 ASSESSMENT — REFRACTION_MANIFEST
OD_ADD: +2.50
OS_SPHERE: +0.75
OD_VA2: 20/20-2
OS_AXIS: 075
OD_VA1: 20/20-2
OS_VA1: 20/20-2
OD_VA3: 20/
OU_VA: 20/
OS_VA3: 20/
OD_AXIS: 100
OS_VA2: 20/20-2
OD_SPHERE: +0.25
OS_ADD: +2.50
OS_CYLINDER: -1.00
OD_CYLINDER: -1.50

## 2020-02-03 ASSESSMENT — LID EXAM ASSESSMENTS
OD_BLEPHARITIS: T
OS_BLEPHARITIS: T

## 2020-02-03 ASSESSMENT — SPHEQUIV_DERIVED
OD_SPHEQUIV: -0.125
OD_SPHEQUIV: -0.5
OS_SPHEQUIV: 0.375
OS_SPHEQUIV: 0.25

## 2020-02-03 ASSESSMENT — CONFRONTATIONAL VISUAL FIELD TEST (CVF)
OD_FINDINGS: FULL
OS_FINDINGS: FULL

## 2020-02-03 ASSESSMENT — TEAR BREAK UP TIME (TBUT)
OS_TBUT: T
OD_TBUT: T

## 2020-02-03 ASSESSMENT — VISUAL ACUITY
OD_BCVA: 20/20-1
OS_BCVA: 20/20

## 2020-02-17 ENCOUNTER — DOCTOR'S OFFICE (OUTPATIENT)
Dept: URBAN - NONMETROPOLITAN AREA CLINIC 1 | Facility: CLINIC | Age: 65
Setting detail: OPHTHALMOLOGY
End: 2020-02-17
Payer: MEDICARE

## 2020-02-17 DIAGNOSIS — H34.8310: ICD-10-CM

## 2020-02-17 DIAGNOSIS — H43.813: ICD-10-CM

## 2020-02-17 DIAGNOSIS — H16.223: ICD-10-CM

## 2020-02-17 DIAGNOSIS — H25.13: ICD-10-CM

## 2020-02-17 PROCEDURE — 92014 COMPRE OPH EXAM EST PT 1/>: CPT | Performed by: OPHTHALMOLOGY

## 2020-02-17 PROCEDURE — 92235 FLUORESCEIN ANGRPH MLTIFRAME: CPT | Performed by: OPHTHALMOLOGY

## 2020-02-17 PROCEDURE — 92250 FUNDUS PHOTOGRAPHY W/I&R: CPT | Performed by: OPHTHALMOLOGY

## 2020-02-17 ASSESSMENT — REFRACTION_AUTOREFRACTION
OS_AXIS: 60
OD_CYLINDER: -0.75
OD_SPHERE: +0.25
OS_CYLINDER: -1.25
OD_AXIS: 86
OS_SPHERE: +1.00

## 2020-02-17 ASSESSMENT — REFRACTION_MANIFEST
OD_AXIS: 100
OS_AXIS: 075
OS_SPHERE: +0.75
OS_ADD: +2.50
OD_VA2: 20/20-2
OD_VA1: 20/20-2
OS_VA2: 20/20-2
OD_CYLINDER: -1.50
OD_ADD: +2.50
OS_CYLINDER: -1.00
OS_VA1: 20/20-2
OD_SPHERE: +0.25

## 2020-02-17 ASSESSMENT — REFRACTION_CURRENTRX
OD_VPRISM_DIRECTION: SV
OS_OVR_VA: 20/
OD_OVR_VA: 20/
OD_AXIS: 074
OS_AXIS: 092
OS_CYLINDER: -1.75
OS_SPHERE: +1.50
OD_SPHERE: +1.50
OS_VPRISM_DIRECTION: SV
OD_CYLINDER: -2.25

## 2020-02-17 ASSESSMENT — LID EXAM ASSESSMENTS
OD_BLEPHARITIS: T
OS_BLEPHARITIS: T

## 2020-02-17 ASSESSMENT — VISUAL ACUITY
OD_BCVA: 20/20
OS_BCVA: 20/20-1

## 2020-02-17 ASSESSMENT — SPHEQUIV_DERIVED
OD_SPHEQUIV: -0.125
OS_SPHEQUIV: 0.25
OD_SPHEQUIV: -0.5
OS_SPHEQUIV: 0.375

## 2020-02-17 ASSESSMENT — TEAR BREAK UP TIME (TBUT)
OS_TBUT: T
OD_TBUT: T

## 2020-02-17 ASSESSMENT — CONFRONTATIONAL VISUAL FIELD TEST (CVF)
OD_FINDINGS: FULL
OS_FINDINGS: FULL

## 2020-04-15 ENCOUNTER — TELEPHONE (OUTPATIENT)
Dept: FAMILY MEDICINE CLINIC | Facility: CLINIC | Age: 65
End: 2020-04-15

## 2020-05-04 ENCOUNTER — DOCTOR'S OFFICE (OUTPATIENT)
Dept: URBAN - NONMETROPOLITAN AREA CLINIC 1 | Facility: CLINIC | Age: 65
Setting detail: OPHTHALMOLOGY
End: 2020-05-04
Payer: MEDICARE

## 2020-05-04 DIAGNOSIS — H34.8310: ICD-10-CM

## 2020-05-04 DIAGNOSIS — H25.13: ICD-10-CM

## 2020-05-04 DIAGNOSIS — H43.813: ICD-10-CM

## 2020-05-04 DIAGNOSIS — H16.223: ICD-10-CM

## 2020-05-04 PROCEDURE — 67210 TREATMENT OF RETINAL LESION: CPT | Performed by: OPHTHALMOLOGY

## 2020-05-04 PROCEDURE — 92134 CPTRZ OPH DX IMG PST SGM RTA: CPT | Performed by: OPHTHALMOLOGY

## 2020-05-04 PROCEDURE — 92014 COMPRE OPH EXAM EST PT 1/>: CPT | Performed by: OPHTHALMOLOGY

## 2020-05-04 ASSESSMENT — LID EXAM ASSESSMENTS
OS_BLEPHARITIS: T
OD_BLEPHARITIS: T

## 2020-05-04 ASSESSMENT — CONFRONTATIONAL VISUAL FIELD TEST (CVF)
OS_FINDINGS: FULL
OD_FINDINGS: FULL

## 2020-05-04 ASSESSMENT — TEAR BREAK UP TIME (TBUT)
OD_TBUT: T
OS_TBUT: T

## 2020-05-08 ASSESSMENT — REFRACTION_MANIFEST
OS_VA2: 20/20-2
OS_VA1: 20/20-2
OS_ADD: +2.50
OS_CYLINDER: -1.00
OD_SPHERE: +0.25
OD_AXIS: 100
OD_ADD: +2.50
OD_VA1: 20/20-2
OS_AXIS: 075
OS_SPHERE: +0.75
OD_VA2: 20/20-2
OD_CYLINDER: -1.50

## 2020-05-08 ASSESSMENT — REFRACTION_AUTOREFRACTION
OD_SPHERE: +0.25
OS_AXIS: 60
OD_AXIS: 86
OD_CYLINDER: -0.75
OS_CYLINDER: -1.25
OS_SPHERE: +1.00

## 2020-05-08 ASSESSMENT — REFRACTION_CURRENTRX
OS_VPRISM_DIRECTION: SV
OS_CYLINDER: -1.75
OS_SPHERE: +1.50
OD_OVR_VA: 20/
OS_AXIS: 092
OD_AXIS: 074
OD_VPRISM_DIRECTION: SV
OD_CYLINDER: -2.25
OS_OVR_VA: 20/
OD_SPHERE: +1.50

## 2020-05-08 ASSESSMENT — SPHEQUIV_DERIVED
OS_SPHEQUIV: 0.25
OD_SPHEQUIV: -0.125
OD_SPHEQUIV: -0.5
OS_SPHEQUIV: 0.375

## 2020-05-08 ASSESSMENT — VISUAL ACUITY
OD_BCVA: 20/20
OS_BCVA: 20/20

## 2020-05-18 ENCOUNTER — DOCTOR'S OFFICE (OUTPATIENT)
Dept: URBAN - NONMETROPOLITAN AREA CLINIC 1 | Facility: CLINIC | Age: 65
Setting detail: OPHTHALMOLOGY
End: 2020-05-18
Payer: MEDICARE

## 2020-05-18 DIAGNOSIS — H34.8310: ICD-10-CM

## 2020-05-18 DIAGNOSIS — H43.813: ICD-10-CM

## 2020-05-18 PROCEDURE — 99024 POSTOP FOLLOW-UP VISIT: CPT | Performed by: OPHTHALMOLOGY

## 2020-05-18 PROCEDURE — 92250 FUNDUS PHOTOGRAPHY W/I&R: CPT | Performed by: OPHTHALMOLOGY

## 2020-05-18 PROCEDURE — 67028 INJECTION EYE DRUG: CPT | Performed by: OPHTHALMOLOGY

## 2020-05-18 PROCEDURE — 92235 FLUORESCEIN ANGRPH MLTIFRAME: CPT | Performed by: OPHTHALMOLOGY

## 2020-05-18 ASSESSMENT — REFRACTION_AUTOREFRACTION
OD_CYLINDER: -0.75
OD_SPHERE: +0.25
OD_AXIS: 86
OS_CYLINDER: -1.25
OS_SPHERE: +1.00
OS_AXIS: 60

## 2020-05-18 ASSESSMENT — VISUAL ACUITY
OD_BCVA: 20/20
OS_BCVA: 20/20

## 2020-05-18 ASSESSMENT — SPHEQUIV_DERIVED
OD_SPHEQUIV: -0.125
OS_SPHEQUIV: 0.375

## 2020-06-08 ENCOUNTER — DOCTOR'S OFFICE (OUTPATIENT)
Dept: URBAN - NONMETROPOLITAN AREA CLINIC 1 | Facility: CLINIC | Age: 65
Setting detail: OPHTHALMOLOGY
End: 2020-06-08
Payer: MEDICARE

## 2020-06-08 VITALS — HEIGHT: 60 IN

## 2020-06-08 DIAGNOSIS — H43.811: ICD-10-CM

## 2020-06-08 DIAGNOSIS — H16.221: ICD-10-CM

## 2020-06-08 DIAGNOSIS — H43.813: ICD-10-CM

## 2020-06-08 DIAGNOSIS — H25.11: ICD-10-CM

## 2020-06-08 DIAGNOSIS — H16.222: ICD-10-CM

## 2020-06-08 DIAGNOSIS — H34.8310: ICD-10-CM

## 2020-06-08 DIAGNOSIS — H25.12: ICD-10-CM

## 2020-06-08 DIAGNOSIS — H43.812: ICD-10-CM

## 2020-06-08 PROCEDURE — 99024 POSTOP FOLLOW-UP VISIT: CPT | Performed by: OPHTHALMOLOGY

## 2020-06-08 PROCEDURE — 92134 CPTRZ OPH DX IMG PST SGM RTA: CPT | Performed by: OPHTHALMOLOGY

## 2020-06-08 ASSESSMENT — VISUAL ACUITY
OS_BCVA: 20/20
OD_BCVA: 20/20

## 2020-06-08 ASSESSMENT — SPHEQUIV_DERIVED
OS_SPHEQUIV: 0.375
OD_SPHEQUIV: -0.125

## 2020-06-08 ASSESSMENT — TEAR BREAK UP TIME (TBUT)
OD_TBUT: T
OS_TBUT: T

## 2020-06-08 ASSESSMENT — REFRACTION_AUTOREFRACTION
OS_CYLINDER: -1.25
OS_SPHERE: +1.00
OD_CYLINDER: -0.75
OD_SPHERE: +0.25
OS_AXIS: 60
OD_AXIS: 86

## 2020-06-08 ASSESSMENT — CONFRONTATIONAL VISUAL FIELD TEST (CVF)
OD_FINDINGS: FULL
OS_FINDINGS: FULL

## 2020-06-08 ASSESSMENT — LID EXAM ASSESSMENTS
OD_BLEPHARITIS: T
OS_BLEPHARITIS: T

## 2020-06-22 ENCOUNTER — DOCTOR'S OFFICE (OUTPATIENT)
Dept: URBAN - NONMETROPOLITAN AREA CLINIC 1 | Facility: CLINIC | Age: 65
Setting detail: OPHTHALMOLOGY
End: 2020-06-22
Payer: MEDICARE

## 2020-06-22 VITALS — HEIGHT: 60 IN

## 2020-06-22 DIAGNOSIS — H34.8310: ICD-10-CM

## 2020-06-22 PROCEDURE — 99024 POSTOP FOLLOW-UP VISIT: CPT | Performed by: OPHTHALMOLOGY

## 2020-06-22 PROCEDURE — 67028 INJECTION EYE DRUG: CPT | Performed by: OPHTHALMOLOGY

## 2020-06-22 ASSESSMENT — TEAR BREAK UP TIME (TBUT)
OS_TBUT: T
OD_TBUT: T

## 2020-06-22 ASSESSMENT — REFRACTION_AUTOREFRACTION
OD_AXIS: 86
OD_SPHERE: +0.25
OS_AXIS: 60
OS_CYLINDER: -1.25
OD_CYLINDER: -0.75
OS_SPHERE: +1.00

## 2020-06-22 ASSESSMENT — SPHEQUIV_DERIVED
OD_SPHEQUIV: -0.125
OS_SPHEQUIV: 0.375

## 2020-06-22 ASSESSMENT — VISUAL ACUITY
OD_BCVA: 20/20
OS_BCVA: 20/20

## 2020-06-22 ASSESSMENT — CONFRONTATIONAL VISUAL FIELD TEST (CVF)
OD_FINDINGS: FULL
OS_FINDINGS: FULL

## 2020-06-22 ASSESSMENT — LID EXAM ASSESSMENTS
OS_BLEPHARITIS: T
OD_BLEPHARITIS: T

## 2020-07-13 ENCOUNTER — DOCTOR'S OFFICE (OUTPATIENT)
Dept: URBAN - NONMETROPOLITAN AREA CLINIC 1 | Facility: CLINIC | Age: 65
Setting detail: OPHTHALMOLOGY
End: 2020-07-13
Payer: MEDICARE

## 2020-07-13 DIAGNOSIS — H34.8310: ICD-10-CM

## 2020-07-13 DIAGNOSIS — H25.12: ICD-10-CM

## 2020-07-13 DIAGNOSIS — H16.221: ICD-10-CM

## 2020-07-13 DIAGNOSIS — H25.11: ICD-10-CM

## 2020-07-13 DIAGNOSIS — H16.222: ICD-10-CM

## 2020-07-13 DIAGNOSIS — H43.812: ICD-10-CM

## 2020-07-13 DIAGNOSIS — H43.811: ICD-10-CM

## 2020-07-13 DIAGNOSIS — H43.813: ICD-10-CM

## 2020-07-13 PROCEDURE — 83861 MICROFLUID ANALY TEARS: CPT | Performed by: OPHTHALMOLOGY

## 2020-07-13 PROCEDURE — 92134 CPTRZ OPH DX IMG PST SGM RTA: CPT | Performed by: OPHTHALMOLOGY

## 2020-07-13 PROCEDURE — 99024 POSTOP FOLLOW-UP VISIT: CPT | Performed by: OPHTHALMOLOGY

## 2020-07-13 PROCEDURE — 92201 OPSCPY EXTND RTA DRAW UNI/BI: CPT | Performed by: OPHTHALMOLOGY

## 2020-07-13 ASSESSMENT — REFRACTION_AUTOREFRACTION
OS_SPHERE: +1.00
OD_SPHERE: +0.25
OD_CYLINDER: -0.75
OS_AXIS: 60
OD_AXIS: 86
OS_CYLINDER: -1.25

## 2020-07-13 ASSESSMENT — CONFRONTATIONAL VISUAL FIELD TEST (CVF)
OD_FINDINGS: FULL
OS_FINDINGS: FULL

## 2020-07-13 ASSESSMENT — TEAR BREAK UP TIME (TBUT)
OS_TBUT: T
OD_TBUT: T

## 2020-07-13 ASSESSMENT — LID EXAM ASSESSMENTS
OS_BLEPHARITIS: T
OD_BLEPHARITIS: T

## 2020-07-13 ASSESSMENT — VISUAL ACUITY
OD_BCVA: 20/20
OS_BCVA: 20/20

## 2020-07-13 ASSESSMENT — SPHEQUIV_DERIVED
OD_SPHEQUIV: -0.125
OS_SPHEQUIV: 0.375

## 2020-07-27 ENCOUNTER — DOCTOR'S OFFICE (OUTPATIENT)
Dept: URBAN - NONMETROPOLITAN AREA CLINIC 1 | Facility: CLINIC | Age: 65
Setting detail: OPHTHALMOLOGY
End: 2020-07-27
Payer: MEDICARE

## 2020-07-27 VITALS — HEIGHT: 60 IN

## 2020-07-27 DIAGNOSIS — H34.8310: ICD-10-CM

## 2020-07-27 DIAGNOSIS — H43.813: ICD-10-CM

## 2020-07-27 PROCEDURE — 92235 FLUORESCEIN ANGRPH MLTIFRAME: CPT | Performed by: OPHTHALMOLOGY

## 2020-07-27 PROCEDURE — 67028 INJECTION EYE DRUG: CPT | Performed by: OPHTHALMOLOGY

## 2020-07-27 PROCEDURE — 92250 FUNDUS PHOTOGRAPHY W/I&R: CPT | Performed by: OPHTHALMOLOGY

## 2020-07-27 ASSESSMENT — REFRACTION_AUTOREFRACTION
OD_SPHERE: +0.25
OS_AXIS: 60
OS_CYLINDER: -1.25
OD_CYLINDER: -0.75
OS_SPHERE: +1.00
OD_AXIS: 86

## 2020-07-27 ASSESSMENT — VISUAL ACUITY
OS_BCVA: 20/20
OD_BCVA: 20/20

## 2020-07-27 ASSESSMENT — CONFRONTATIONAL VISUAL FIELD TEST (CVF)
OS_FINDINGS: FULL
OD_FINDINGS: FULL

## 2020-07-27 ASSESSMENT — SPHEQUIV_DERIVED
OD_SPHEQUIV: -0.125
OS_SPHEQUIV: 0.375

## 2020-08-10 ENCOUNTER — DOCTOR'S OFFICE (OUTPATIENT)
Dept: URBAN - NONMETROPOLITAN AREA CLINIC 1 | Facility: CLINIC | Age: 65
Setting detail: OPHTHALMOLOGY
End: 2020-08-10
Payer: MEDICARE

## 2020-08-10 DIAGNOSIS — H34.8310: ICD-10-CM

## 2020-08-10 DIAGNOSIS — H25.13: ICD-10-CM

## 2020-08-10 DIAGNOSIS — H43.813: ICD-10-CM

## 2020-08-10 PROCEDURE — 92201 OPSCPY EXTND RTA DRAW UNI/BI: CPT | Performed by: OPHTHALMOLOGY

## 2020-08-10 PROCEDURE — 92134 CPTRZ OPH DX IMG PST SGM RTA: CPT | Performed by: OPHTHALMOLOGY

## 2020-08-10 PROCEDURE — 92014 COMPRE OPH EXAM EST PT 1/>: CPT | Performed by: OPHTHALMOLOGY

## 2020-08-10 ASSESSMENT — LID EXAM ASSESSMENTS
OD_BLEPHARITIS: T
OS_BLEPHARITIS: T

## 2020-08-10 ASSESSMENT — VISUAL ACUITY
OD_BCVA: 20/20
OS_BCVA: 20/30-2

## 2020-08-10 ASSESSMENT — TEAR BREAK UP TIME (TBUT)
OD_TBUT: T
OS_TBUT: T

## 2020-08-10 ASSESSMENT — REFRACTION_AUTOREFRACTION
OS_CYLINDER: -1.25
OD_SPHERE: +0.25
OS_SPHERE: +1.00
OD_CYLINDER: -0.75
OS_AXIS: 60
OD_AXIS: 86

## 2020-08-10 ASSESSMENT — SPHEQUIV_DERIVED
OS_SPHEQUIV: 0.375
OD_SPHEQUIV: -0.125

## 2020-08-10 ASSESSMENT — CONFRONTATIONAL VISUAL FIELD TEST (CVF)
OD_FINDINGS: FULL
OS_FINDINGS: FULL

## 2020-09-14 ENCOUNTER — DOCTOR'S OFFICE (OUTPATIENT)
Dept: URBAN - NONMETROPOLITAN AREA CLINIC 1 | Facility: CLINIC | Age: 65
Setting detail: OPHTHALMOLOGY
End: 2020-09-14
Payer: COMMERCIAL

## 2020-09-14 VITALS — HEIGHT: 60 IN

## 2020-09-14 DIAGNOSIS — H34.8310: ICD-10-CM

## 2020-09-14 PROCEDURE — 67028 INJECTION EYE DRUG: CPT | Performed by: OPHTHALMOLOGY

## 2020-09-14 ASSESSMENT — REFRACTION_AUTOREFRACTION
OS_CYLINDER: -1.25
OS_AXIS: 60
OD_SPHERE: +0.25
OS_SPHERE: +1.00
OD_AXIS: 86
OD_CYLINDER: -0.75

## 2020-09-14 ASSESSMENT — VISUAL ACUITY
OD_BCVA: 20/25
OS_BCVA: 20/25

## 2020-09-14 ASSESSMENT — SPHEQUIV_DERIVED
OD_SPHEQUIV: -0.125
OS_SPHEQUIV: 0.375

## 2020-09-28 ENCOUNTER — DOCTOR'S OFFICE (OUTPATIENT)
Dept: URBAN - NONMETROPOLITAN AREA CLINIC 1 | Facility: CLINIC | Age: 65
Setting detail: OPHTHALMOLOGY
End: 2020-09-28
Payer: COMMERCIAL

## 2020-09-28 VITALS — HEIGHT: 60 IN

## 2020-09-28 DIAGNOSIS — H16.223: ICD-10-CM

## 2020-09-28 DIAGNOSIS — H25.13: ICD-10-CM

## 2020-09-28 DIAGNOSIS — H43.813: ICD-10-CM

## 2020-09-28 DIAGNOSIS — H34.8310: ICD-10-CM

## 2020-09-28 PROCEDURE — 92014 COMPRE OPH EXAM EST PT 1/>: CPT | Performed by: OPHTHALMOLOGY

## 2020-09-28 PROCEDURE — 92134 CPTRZ OPH DX IMG PST SGM RTA: CPT | Performed by: OPHTHALMOLOGY

## 2020-09-28 PROCEDURE — 92201 OPSCPY EXTND RTA DRAW UNI/BI: CPT | Performed by: OPHTHALMOLOGY

## 2020-09-28 ASSESSMENT — TEAR BREAK UP TIME (TBUT)
OS_TBUT: T
OD_TBUT: T

## 2020-09-28 ASSESSMENT — REFRACTION_AUTOREFRACTION
OD_AXIS: 86
OS_SPHERE: +1.00
OS_AXIS: 60
OS_CYLINDER: -1.25
OD_SPHERE: +0.25
OD_CYLINDER: -0.75

## 2020-09-28 ASSESSMENT — LID EXAM ASSESSMENTS
OD_BLEPHARITIS: T
OS_BLEPHARITIS: T

## 2020-09-28 ASSESSMENT — SPHEQUIV_DERIVED
OS_SPHEQUIV: 0.375
OD_SPHEQUIV: -0.125

## 2020-09-28 ASSESSMENT — CONFRONTATIONAL VISUAL FIELD TEST (CVF)
OD_FINDINGS: FULL
OS_FINDINGS: FULL

## 2020-09-28 ASSESSMENT — VISUAL ACUITY
OS_BCVA: 20/25
OD_BCVA: 20/25+1

## 2020-10-12 ENCOUNTER — DOCTOR'S OFFICE (OUTPATIENT)
Dept: URBAN - NONMETROPOLITAN AREA CLINIC 1 | Facility: CLINIC | Age: 65
Setting detail: OPHTHALMOLOGY
End: 2020-10-12
Payer: COMMERCIAL

## 2020-10-12 DIAGNOSIS — H34.8310: ICD-10-CM

## 2020-10-12 PROCEDURE — 67028 INJECTION EYE DRUG: CPT | Performed by: OPHTHALMOLOGY

## 2020-10-12 ASSESSMENT — REFRACTION_AUTOREFRACTION
OS_CYLINDER: -1.25
OS_SPHERE: +1.00
OD_SPHERE: +0.25
OD_AXIS: 86
OS_AXIS: 60
OD_CYLINDER: -0.75

## 2020-10-12 ASSESSMENT — SPHEQUIV_DERIVED
OS_SPHEQUIV: 0.375
OD_SPHEQUIV: -0.125

## 2020-10-12 ASSESSMENT — VISUAL ACUITY
OD_BCVA: 20/25+1
OS_BCVA: 20/25

## 2020-10-26 ENCOUNTER — DOCTOR'S OFFICE (OUTPATIENT)
Dept: URBAN - NONMETROPOLITAN AREA CLINIC 1 | Facility: CLINIC | Age: 65
Setting detail: OPHTHALMOLOGY
End: 2020-10-26
Payer: COMMERCIAL

## 2020-10-26 ENCOUNTER — RX ONLY (RX ONLY)
Age: 65
End: 2020-10-26

## 2020-10-26 DIAGNOSIS — H34.8310: ICD-10-CM

## 2020-10-26 DIAGNOSIS — H43.813: ICD-10-CM

## 2020-10-26 DIAGNOSIS — H16.223: ICD-10-CM

## 2020-10-26 DIAGNOSIS — H25.13: ICD-10-CM

## 2020-10-26 PROCEDURE — 92014 COMPRE OPH EXAM EST PT 1/>: CPT | Performed by: OPHTHALMOLOGY

## 2020-10-26 PROCEDURE — 92134 CPTRZ OPH DX IMG PST SGM RTA: CPT | Performed by: OPHTHALMOLOGY

## 2020-10-26 ASSESSMENT — CONFRONTATIONAL VISUAL FIELD TEST (CVF)
OD_FINDINGS: FULL
OS_FINDINGS: FULL

## 2020-10-26 ASSESSMENT — TEAR BREAK UP TIME (TBUT)
OS_TBUT: T
OD_TBUT: T

## 2020-10-26 ASSESSMENT — VISUAL ACUITY
OS_BCVA: 20/20-1
OD_BCVA: 20/20-1

## 2020-10-26 ASSESSMENT — LID EXAM ASSESSMENTS
OS_BLEPHARITIS: T
OD_BLEPHARITIS: T

## 2020-10-26 ASSESSMENT — REFRACTION_AUTOREFRACTION
OS_CYLINDER: -1.25
OS_SPHERE: +1.00
OD_SPHERE: +0.25
OS_AXIS: 60
OD_CYLINDER: -0.75
OD_AXIS: 86

## 2020-10-26 ASSESSMENT — SPHEQUIV_DERIVED
OD_SPHEQUIV: -0.125
OS_SPHEQUIV: 0.375

## 2020-11-16 ENCOUNTER — DOCTOR'S OFFICE (OUTPATIENT)
Dept: URBAN - NONMETROPOLITAN AREA CLINIC 1 | Facility: CLINIC | Age: 65
Setting detail: OPHTHALMOLOGY
End: 2020-11-16
Payer: COMMERCIAL

## 2020-11-16 DIAGNOSIS — H34.8310: ICD-10-CM

## 2020-11-16 PROCEDURE — 67028 INJECTION EYE DRUG: CPT | Performed by: OPHTHALMOLOGY

## 2020-11-16 ASSESSMENT — REFRACTION_AUTOREFRACTION
OD_AXIS: 86
OS_AXIS: 60
OD_SPHERE: +0.25
OS_CYLINDER: -1.25
OS_SPHERE: +1.00
OD_CYLINDER: -0.75

## 2020-11-16 ASSESSMENT — SPHEQUIV_DERIVED
OS_SPHEQUIV: 0.375
OD_SPHEQUIV: -0.125

## 2020-11-16 ASSESSMENT — VISUAL ACUITY
OD_BCVA: 20/20
OS_BCVA: 20/20-1

## 2020-11-30 ENCOUNTER — DOCTOR'S OFFICE (OUTPATIENT)
Dept: URBAN - NONMETROPOLITAN AREA CLINIC 1 | Facility: CLINIC | Age: 65
Setting detail: OPHTHALMOLOGY
End: 2020-11-30
Payer: COMMERCIAL

## 2020-11-30 VITALS — HEIGHT: 60 IN

## 2020-11-30 DIAGNOSIS — H34.8310: ICD-10-CM

## 2020-11-30 DIAGNOSIS — H43.813: ICD-10-CM

## 2020-11-30 DIAGNOSIS — H16.221: ICD-10-CM

## 2020-11-30 DIAGNOSIS — H25.13: ICD-10-CM

## 2020-11-30 DIAGNOSIS — H16.222: ICD-10-CM

## 2020-11-30 DIAGNOSIS — H16.223: ICD-10-CM

## 2020-11-30 PROCEDURE — 83861 MICROFLUID ANALY TEARS: CPT | Performed by: OPHTHALMOLOGY

## 2020-11-30 PROCEDURE — 92134 CPTRZ OPH DX IMG PST SGM RTA: CPT | Performed by: OPHTHALMOLOGY

## 2020-11-30 PROCEDURE — 92014 COMPRE OPH EXAM EST PT 1/>: CPT | Performed by: OPHTHALMOLOGY

## 2020-11-30 ASSESSMENT — REFRACTION_AUTOREFRACTION
OD_SPHERE: +0.25
OS_CYLINDER: -1.25
OS_AXIS: 60
OS_SPHERE: +1.00
OD_AXIS: 86
OD_CYLINDER: -0.75

## 2020-11-30 ASSESSMENT — VISUAL ACUITY
OS_BCVA: 20/25+2
OD_BCVA: 20/20-1

## 2020-11-30 ASSESSMENT — LID EXAM ASSESSMENTS
OS_BLEPHARITIS: T
OD_BLEPHARITIS: T

## 2020-11-30 ASSESSMENT — CONFRONTATIONAL VISUAL FIELD TEST (CVF)
OS_FINDINGS: FULL
OD_FINDINGS: FULL

## 2020-11-30 ASSESSMENT — SPHEQUIV_DERIVED
OS_SPHEQUIV: 0.375
OD_SPHEQUIV: -0.125

## 2020-11-30 ASSESSMENT — TEAR BREAK UP TIME (TBUT)
OD_TBUT: T
OS_TBUT: T

## 2020-12-14 ENCOUNTER — DOCTOR'S OFFICE (OUTPATIENT)
Dept: URBAN - NONMETROPOLITAN AREA CLINIC 1 | Facility: CLINIC | Age: 65
Setting detail: OPHTHALMOLOGY
End: 2020-12-14
Payer: COMMERCIAL

## 2020-12-14 VITALS — HEIGHT: 60 IN

## 2020-12-14 DIAGNOSIS — H35.81: ICD-10-CM

## 2020-12-14 DIAGNOSIS — H34.8310: ICD-10-CM

## 2020-12-14 PROCEDURE — 92250 FUNDUS PHOTOGRAPHY W/I&R: CPT | Performed by: OPHTHALMOLOGY

## 2020-12-14 PROCEDURE — 67028 INJECTION EYE DRUG: CPT | Performed by: OPHTHALMOLOGY

## 2020-12-14 PROCEDURE — 92235 FLUORESCEIN ANGRPH MLTIFRAME: CPT | Performed by: OPHTHALMOLOGY

## 2020-12-14 ASSESSMENT — SPHEQUIV_DERIVED
OS_SPHEQUIV: 0.375
OD_SPHEQUIV: -0.125

## 2020-12-14 ASSESSMENT — CONFRONTATIONAL VISUAL FIELD TEST (CVF)
OS_FINDINGS: FULL
OD_FINDINGS: FULL

## 2020-12-14 ASSESSMENT — REFRACTION_AUTOREFRACTION
OS_AXIS: 60
OS_CYLINDER: -1.25
OD_SPHERE: +0.25
OD_AXIS: 86
OD_CYLINDER: -0.75
OS_SPHERE: +1.00

## 2020-12-14 ASSESSMENT — VISUAL ACUITY
OD_BCVA: 20/20-1
OS_BCVA: 20/20-2

## 2021-01-04 ENCOUNTER — DOCTOR'S OFFICE (OUTPATIENT)
Dept: URBAN - NONMETROPOLITAN AREA CLINIC 1 | Facility: CLINIC | Age: 66
Setting detail: OPHTHALMOLOGY
End: 2021-01-04
Payer: COMMERCIAL

## 2021-01-04 DIAGNOSIS — H34.8310: ICD-10-CM

## 2021-01-04 DIAGNOSIS — H35.81: ICD-10-CM

## 2021-01-04 DIAGNOSIS — H16.221: ICD-10-CM

## 2021-01-04 DIAGNOSIS — H25.11: ICD-10-CM

## 2021-01-04 DIAGNOSIS — H16.222: ICD-10-CM

## 2021-01-04 DIAGNOSIS — H43.813: ICD-10-CM

## 2021-01-04 PROCEDURE — 92014 COMPRE OPH EXAM EST PT 1/>: CPT | Performed by: OPHTHALMOLOGY

## 2021-01-04 PROCEDURE — 92134 CPTRZ OPH DX IMG PST SGM RTA: CPT | Performed by: OPHTHALMOLOGY

## 2021-01-04 PROCEDURE — 83861 MICROFLUID ANALY TEARS: CPT | Performed by: OPHTHALMOLOGY

## 2021-01-04 ASSESSMENT — TEAR BREAK UP TIME (TBUT)
OD_TBUT: T
OS_TBUT: T

## 2021-01-04 ASSESSMENT — REFRACTION_AUTOREFRACTION
OD_SPHERE: +0.25
OS_CYLINDER: -1.25
OD_CYLINDER: -0.75
OS_SPHERE: +1.00
OD_AXIS: 86
OS_AXIS: 60

## 2021-01-04 ASSESSMENT — CONFRONTATIONAL VISUAL FIELD TEST (CVF)
OD_FINDINGS: FULL
OS_FINDINGS: FULL

## 2021-01-04 ASSESSMENT — SPHEQUIV_DERIVED
OD_SPHEQUIV: -0.125
OS_SPHEQUIV: 0.375

## 2021-01-04 ASSESSMENT — VISUAL ACUITY
OD_BCVA: 20/30+2
OS_BCVA: 20/30+1

## 2021-01-04 ASSESSMENT — LID EXAM ASSESSMENTS
OD_BLEPHARITIS: T
OS_BLEPHARITIS: T

## 2021-01-18 ENCOUNTER — DOCTOR'S OFFICE (OUTPATIENT)
Dept: URBAN - NONMETROPOLITAN AREA CLINIC 1 | Facility: CLINIC | Age: 66
Setting detail: OPHTHALMOLOGY
End: 2021-01-18
Payer: COMMERCIAL

## 2021-01-18 DIAGNOSIS — H34.8310: ICD-10-CM

## 2021-01-18 PROCEDURE — 67028 INJECTION EYE DRUG: CPT | Performed by: OPHTHALMOLOGY

## 2021-01-18 ASSESSMENT — REFRACTION_AUTOREFRACTION
OS_AXIS: 60
OS_SPHERE: +1.00
OD_AXIS: 86
OD_SPHERE: +0.25
OD_CYLINDER: -0.75
OS_CYLINDER: -1.25

## 2021-01-18 ASSESSMENT — SPHEQUIV_DERIVED
OS_SPHEQUIV: 0.375
OD_SPHEQUIV: -0.125

## 2021-01-18 ASSESSMENT — VISUAL ACUITY
OD_BCVA: 20/25
OS_BCVA: 20/30-1

## 2021-02-15 ENCOUNTER — DOCTOR'S OFFICE (OUTPATIENT)
Dept: URBAN - NONMETROPOLITAN AREA CLINIC 1 | Facility: CLINIC | Age: 66
Setting detail: OPHTHALMOLOGY
End: 2021-02-15
Payer: COMMERCIAL

## 2021-02-15 DIAGNOSIS — H35.81: ICD-10-CM

## 2021-02-15 DIAGNOSIS — H43.813: ICD-10-CM

## 2021-02-15 DIAGNOSIS — H16.222: ICD-10-CM

## 2021-02-15 DIAGNOSIS — H25.13: ICD-10-CM

## 2021-02-15 DIAGNOSIS — H16.221: ICD-10-CM

## 2021-02-15 DIAGNOSIS — H34.8310: ICD-10-CM

## 2021-02-15 PROCEDURE — 83861 MICROFLUID ANALY TEARS: CPT | Performed by: OPHTHALMOLOGY

## 2021-02-15 PROCEDURE — 92134 CPTRZ OPH DX IMG PST SGM RTA: CPT | Performed by: OPHTHALMOLOGY

## 2021-02-15 PROCEDURE — 92201 OPSCPY EXTND RTA DRAW UNI/BI: CPT | Performed by: OPHTHALMOLOGY

## 2021-02-15 PROCEDURE — 92014 COMPRE OPH EXAM EST PT 1/>: CPT | Performed by: OPHTHALMOLOGY

## 2021-02-15 ASSESSMENT — SPHEQUIV_DERIVED
OD_SPHEQUIV: -0.125
OS_SPHEQUIV: 0.375

## 2021-02-15 ASSESSMENT — REFRACTION_AUTOREFRACTION
OD_CYLINDER: -0.75
OD_AXIS: 86
OD_SPHERE: +0.25
OS_AXIS: 60
OS_SPHERE: +1.00
OS_CYLINDER: -1.25

## 2021-02-15 ASSESSMENT — TEAR BREAK UP TIME (TBUT)
OS_TBUT: T
OD_TBUT: T

## 2021-02-15 ASSESSMENT — LID EXAM ASSESSMENTS
OS_BLEPHARITIS: T
OD_BLEPHARITIS: T

## 2021-02-15 ASSESSMENT — VISUAL ACUITY
OS_BCVA: 20/25
OD_BCVA: 20/25

## 2021-02-15 ASSESSMENT — CONFRONTATIONAL VISUAL FIELD TEST (CVF)
OS_FINDINGS: FULL
OD_FINDINGS: FULL

## 2021-03-01 ENCOUNTER — DOCTOR'S OFFICE (OUTPATIENT)
Dept: URBAN - NONMETROPOLITAN AREA CLINIC 1 | Facility: CLINIC | Age: 66
Setting detail: OPHTHALMOLOGY
End: 2021-03-01
Payer: COMMERCIAL

## 2021-03-01 DIAGNOSIS — H34.8310: ICD-10-CM

## 2021-03-01 PROCEDURE — 67028 INJECTION EYE DRUG: CPT | Performed by: OPHTHALMOLOGY

## 2021-03-01 ASSESSMENT — VISUAL ACUITY
OS_BCVA: 20/25+2
OD_BCVA: 20/20-1

## 2021-03-01 ASSESSMENT — REFRACTION_AUTOREFRACTION
OD_SPHERE: +0.25
OD_CYLINDER: -0.75
OD_AXIS: 86
OS_CYLINDER: -1.25
OS_AXIS: 60
OS_SPHERE: +1.00

## 2021-03-01 ASSESSMENT — SPHEQUIV_DERIVED
OD_SPHEQUIV: -0.125
OS_SPHEQUIV: 0.375

## 2021-03-15 ENCOUNTER — DOCTOR'S OFFICE (OUTPATIENT)
Dept: URBAN - NONMETROPOLITAN AREA CLINIC 1 | Facility: CLINIC | Age: 66
Setting detail: OPHTHALMOLOGY
End: 2021-03-15
Payer: COMMERCIAL

## 2021-03-15 DIAGNOSIS — H43.813: ICD-10-CM

## 2021-03-15 DIAGNOSIS — H35.81: ICD-10-CM

## 2021-03-15 DIAGNOSIS — H34.8310: ICD-10-CM

## 2021-03-15 DIAGNOSIS — H25.13: ICD-10-CM

## 2021-03-15 PROCEDURE — 92014 COMPRE OPH EXAM EST PT 1/>: CPT | Performed by: OPHTHALMOLOGY

## 2021-03-15 PROCEDURE — 92134 CPTRZ OPH DX IMG PST SGM RTA: CPT | Performed by: OPHTHALMOLOGY

## 2021-03-15 PROCEDURE — 92201 OPSCPY EXTND RTA DRAW UNI/BI: CPT | Performed by: OPHTHALMOLOGY

## 2021-03-15 ASSESSMENT — CONFRONTATIONAL VISUAL FIELD TEST (CVF)
OD_FINDINGS: FULL
OS_FINDINGS: FULL

## 2021-03-15 ASSESSMENT — REFRACTION_AUTOREFRACTION
OS_SPHERE: +1.00
OS_CYLINDER: -1.25
OD_CYLINDER: -0.75
OD_AXIS: 86
OD_SPHERE: +0.25
OS_AXIS: 60

## 2021-03-15 ASSESSMENT — SPHEQUIV_DERIVED
OS_SPHEQUIV: 0.375
OD_SPHEQUIV: -0.125

## 2021-03-15 ASSESSMENT — VISUAL ACUITY
OS_BCVA: 20/30-2
OD_BCVA: 20/20-1

## 2021-03-15 ASSESSMENT — TEAR BREAK UP TIME (TBUT)
OS_TBUT: T
OD_TBUT: T

## 2021-03-15 ASSESSMENT — LID EXAM ASSESSMENTS
OD_BLEPHARITIS: T
OS_BLEPHARITIS: T

## 2021-03-17 DIAGNOSIS — M1A.09X0 IDIOPATHIC CHRONIC GOUT OF MULTIPLE SITES WITHOUT TOPHUS: ICD-10-CM

## 2021-03-17 RX ORDER — ALLOPURINOL 300 MG/1
TABLET ORAL
Qty: 90 TABLET | Refills: 3 | OUTPATIENT
Start: 2021-03-17

## 2021-03-29 ENCOUNTER — DOCTOR'S OFFICE (OUTPATIENT)
Dept: URBAN - NONMETROPOLITAN AREA CLINIC 1 | Facility: CLINIC | Age: 66
Setting detail: OPHTHALMOLOGY
End: 2021-03-29
Payer: COMMERCIAL

## 2021-03-29 DIAGNOSIS — H34.8310: ICD-10-CM

## 2021-03-29 DIAGNOSIS — H35.81: ICD-10-CM

## 2021-03-29 PROCEDURE — 92250 FUNDUS PHOTOGRAPHY W/I&R: CPT | Performed by: OPHTHALMOLOGY

## 2021-03-29 PROCEDURE — 92235 FLUORESCEIN ANGRPH MLTIFRAME: CPT | Performed by: OPHTHALMOLOGY

## 2021-03-29 PROCEDURE — 67028 INJECTION EYE DRUG: CPT | Performed by: OPHTHALMOLOGY

## 2021-03-29 ASSESSMENT — SPHEQUIV_DERIVED
OS_SPHEQUIV: 0.375
OD_SPHEQUIV: -0.125

## 2021-03-29 ASSESSMENT — VISUAL ACUITY
OD_BCVA: 20/20
OS_BCVA: 20/20

## 2021-03-29 ASSESSMENT — REFRACTION_AUTOREFRACTION
OD_SPHERE: +0.25
OS_AXIS: 60
OS_SPHERE: +1.00
OS_CYLINDER: -1.25
OD_CYLINDER: -0.75
OD_AXIS: 86

## 2021-03-29 ASSESSMENT — CONFRONTATIONAL VISUAL FIELD TEST (CVF)
OS_FINDINGS: FULL
OD_FINDINGS: FULL

## 2021-04-12 ENCOUNTER — DOCTOR'S OFFICE (OUTPATIENT)
Dept: URBAN - NONMETROPOLITAN AREA CLINIC 1 | Facility: CLINIC | Age: 66
Setting detail: OPHTHALMOLOGY
End: 2021-04-12
Payer: COMMERCIAL

## 2021-04-12 DIAGNOSIS — H16.223: ICD-10-CM

## 2021-04-12 DIAGNOSIS — H35.81: ICD-10-CM

## 2021-04-12 DIAGNOSIS — H16.222: ICD-10-CM

## 2021-04-12 DIAGNOSIS — H25.13: ICD-10-CM

## 2021-04-12 DIAGNOSIS — H43.813: ICD-10-CM

## 2021-04-12 DIAGNOSIS — H34.8310: ICD-10-CM

## 2021-04-12 PROCEDURE — 92014 COMPRE OPH EXAM EST PT 1/>: CPT | Performed by: OPHTHALMOLOGY

## 2021-04-12 PROCEDURE — 92201 OPSCPY EXTND RTA DRAW UNI/BI: CPT | Performed by: OPHTHALMOLOGY

## 2021-04-12 PROCEDURE — 83861 MICROFLUID ANALY TEARS: CPT | Performed by: OPHTHALMOLOGY

## 2021-04-12 PROCEDURE — 92134 CPTRZ OPH DX IMG PST SGM RTA: CPT | Performed by: OPHTHALMOLOGY

## 2021-04-12 ASSESSMENT — VISUAL ACUITY
OS_BCVA: 20/25+2
OD_BCVA: 20/25-1

## 2021-04-12 ASSESSMENT — TEAR BREAK UP TIME (TBUT)
OS_TBUT: T
OD_TBUT: T

## 2021-04-12 ASSESSMENT — SPHEQUIV_DERIVED
OS_SPHEQUIV: 0.375
OD_SPHEQUIV: -0.125

## 2021-04-12 ASSESSMENT — REFRACTION_AUTOREFRACTION
OS_CYLINDER: -1.25
OD_CYLINDER: -0.75
OS_AXIS: 60
OD_AXIS: 86
OS_SPHERE: +1.00
OD_SPHERE: +0.25

## 2021-04-12 ASSESSMENT — CONFRONTATIONAL VISUAL FIELD TEST (CVF)
OD_FINDINGS: FULL
OS_FINDINGS: FULL

## 2021-04-12 ASSESSMENT — LID EXAM ASSESSMENTS
OD_BLEPHARITIS: T
OS_BLEPHARITIS: T

## 2021-04-26 ENCOUNTER — DOCTOR'S OFFICE (OUTPATIENT)
Dept: URBAN - NONMETROPOLITAN AREA CLINIC 1 | Facility: CLINIC | Age: 66
Setting detail: OPHTHALMOLOGY
End: 2021-04-26
Payer: COMMERCIAL

## 2021-04-26 DIAGNOSIS — H34.8310: ICD-10-CM

## 2021-04-26 PROCEDURE — 67028 INJECTION EYE DRUG: CPT | Performed by: OPHTHALMOLOGY

## 2021-04-26 ASSESSMENT — VISUAL ACUITY
OD_BCVA: 20/25
OS_BCVA: 20/30+1

## 2021-04-26 ASSESSMENT — SPHEQUIV_DERIVED
OS_SPHEQUIV: 0.375
OD_SPHEQUIV: -0.125

## 2021-04-26 ASSESSMENT — REFRACTION_AUTOREFRACTION
OD_SPHERE: +0.25
OS_SPHERE: +1.00
OD_AXIS: 86
OS_CYLINDER: -1.25
OS_AXIS: 60
OD_CYLINDER: -0.75

## 2021-05-10 ENCOUNTER — DOCTOR'S OFFICE (OUTPATIENT)
Dept: URBAN - NONMETROPOLITAN AREA CLINIC 1 | Facility: CLINIC | Age: 66
Setting detail: OPHTHALMOLOGY
End: 2021-05-10
Payer: COMMERCIAL

## 2021-05-10 DIAGNOSIS — H34.8310: ICD-10-CM

## 2021-05-10 DIAGNOSIS — H43.813: ICD-10-CM

## 2021-05-10 DIAGNOSIS — H35.81: ICD-10-CM

## 2021-05-10 DIAGNOSIS — H25.13: ICD-10-CM

## 2021-05-10 PROCEDURE — 92134 CPTRZ OPH DX IMG PST SGM RTA: CPT | Performed by: OPHTHALMOLOGY

## 2021-05-10 PROCEDURE — 92201 OPSCPY EXTND RTA DRAW UNI/BI: CPT | Performed by: OPHTHALMOLOGY

## 2021-05-10 PROCEDURE — 92014 COMPRE OPH EXAM EST PT 1/>: CPT | Performed by: OPHTHALMOLOGY

## 2021-05-10 ASSESSMENT — REFRACTION_AUTOREFRACTION
OD_AXIS: 86
OD_CYLINDER: -0.75
OS_CYLINDER: -1.25
OS_SPHERE: +1.00
OD_SPHERE: +0.25
OS_AXIS: 60

## 2021-05-10 ASSESSMENT — LID EXAM ASSESSMENTS
OD_BLEPHARITIS: T
OS_BLEPHARITIS: T

## 2021-05-10 ASSESSMENT — TEAR BREAK UP TIME (TBUT)
OD_TBUT: T
OS_TBUT: T

## 2021-05-10 ASSESSMENT — CONFRONTATIONAL VISUAL FIELD TEST (CVF)
OS_FINDINGS: FULL
OD_FINDINGS: FULL

## 2021-05-10 ASSESSMENT — VISUAL ACUITY
OS_BCVA: 20/25
OD_BCVA: 20/20-2

## 2021-05-10 ASSESSMENT — SPHEQUIV_DERIVED
OS_SPHEQUIV: 0.375
OD_SPHEQUIV: -0.125

## 2021-06-07 ENCOUNTER — DOCTOR'S OFFICE (OUTPATIENT)
Dept: URBAN - NONMETROPOLITAN AREA CLINIC 1 | Facility: CLINIC | Age: 66
Setting detail: OPHTHALMOLOGY
End: 2021-06-07
Payer: COMMERCIAL

## 2021-06-07 DIAGNOSIS — H34.8310: ICD-10-CM

## 2021-06-07 DIAGNOSIS — H35.81: ICD-10-CM

## 2021-06-07 PROCEDURE — 67028 INJECTION EYE DRUG: CPT | Performed by: OPHTHALMOLOGY

## 2021-06-07 ASSESSMENT — REFRACTION_AUTOREFRACTION
OS_SPHERE: +1.00
OD_AXIS: 86
OD_CYLINDER: -0.75
OS_AXIS: 60
OD_SPHERE: +0.25
OS_CYLINDER: -1.25

## 2021-06-07 ASSESSMENT — SPHEQUIV_DERIVED
OS_SPHEQUIV: 0.375
OD_SPHEQUIV: -0.125

## 2021-06-07 ASSESSMENT — VISUAL ACUITY
OS_BCVA: 20/25
OD_BCVA: 20/20

## 2021-06-21 ENCOUNTER — DOCTOR'S OFFICE (OUTPATIENT)
Dept: URBAN - NONMETROPOLITAN AREA CLINIC 1 | Facility: CLINIC | Age: 66
Setting detail: OPHTHALMOLOGY
End: 2021-06-21
Payer: COMMERCIAL

## 2021-06-21 DIAGNOSIS — H35.81: ICD-10-CM

## 2021-06-21 DIAGNOSIS — H34.8310: ICD-10-CM

## 2021-06-21 DIAGNOSIS — H43.813: ICD-10-CM

## 2021-06-21 DIAGNOSIS — H25.113: ICD-10-CM

## 2021-06-21 PROCEDURE — 92201 OPSCPY EXTND RTA DRAW UNI/BI: CPT | Performed by: OPHTHALMOLOGY

## 2021-06-21 PROCEDURE — 92134 CPTRZ OPH DX IMG PST SGM RTA: CPT | Performed by: OPHTHALMOLOGY

## 2021-06-21 PROCEDURE — 92014 COMPRE OPH EXAM EST PT 1/>: CPT | Performed by: OPHTHALMOLOGY

## 2021-06-21 ASSESSMENT — CONFRONTATIONAL VISUAL FIELD TEST (CVF)
OD_FINDINGS: FULL
OS_FINDINGS: FULL

## 2021-06-21 ASSESSMENT — REFRACTION_AUTOREFRACTION
OS_AXIS: 60
OS_SPHERE: +1.00
OD_AXIS: 86
OD_CYLINDER: -0.75
OS_CYLINDER: -1.25
OD_SPHERE: +0.25

## 2021-06-21 ASSESSMENT — TEAR BREAK UP TIME (TBUT)
OD_TBUT: T
OS_TBUT: T

## 2021-06-21 ASSESSMENT — VISUAL ACUITY
OD_BCVA: 20/30-2
OS_BCVA: 20/30-2

## 2021-06-21 ASSESSMENT — SPHEQUIV_DERIVED
OS_SPHEQUIV: 0.375
OD_SPHEQUIV: -0.125

## 2021-06-21 ASSESSMENT — LID EXAM ASSESSMENTS
OS_BLEPHARITIS: T
OD_BLEPHARITIS: T

## 2021-07-19 ENCOUNTER — RX ONLY (RX ONLY)
Age: 66
End: 2021-07-19

## 2021-07-19 ENCOUNTER — DOCTOR'S OFFICE (OUTPATIENT)
Dept: URBAN - NONMETROPOLITAN AREA CLINIC 1 | Facility: CLINIC | Age: 66
Setting detail: OPHTHALMOLOGY
End: 2021-07-19
Payer: COMMERCIAL

## 2021-07-19 DIAGNOSIS — H34.8310: ICD-10-CM

## 2021-07-19 PROCEDURE — 67028 INJECTION EYE DRUG: CPT | Performed by: OPHTHALMOLOGY

## 2021-07-19 ASSESSMENT — REFRACTION_AUTOREFRACTION
OD_AXIS: 86
OD_SPHERE: +0.25
OD_CYLINDER: -0.75
OS_AXIS: 60
OS_CYLINDER: -1.25
OS_SPHERE: +1.00

## 2021-07-19 ASSESSMENT — SPHEQUIV_DERIVED
OS_SPHEQUIV: 0.375
OD_SPHEQUIV: -0.125

## 2021-07-19 ASSESSMENT — VISUAL ACUITY
OS_BCVA: 20/25
OD_BCVA: 20/30-2

## 2021-08-09 ENCOUNTER — DOCTOR'S OFFICE (OUTPATIENT)
Dept: URBAN - NONMETROPOLITAN AREA CLINIC 1 | Facility: CLINIC | Age: 66
Setting detail: OPHTHALMOLOGY
End: 2021-08-09
Payer: COMMERCIAL

## 2021-08-09 DIAGNOSIS — H43.811: ICD-10-CM

## 2021-08-09 DIAGNOSIS — H34.8310: ICD-10-CM

## 2021-08-09 DIAGNOSIS — H43.812: ICD-10-CM

## 2021-08-09 DIAGNOSIS — H35.81: ICD-10-CM

## 2021-08-09 DIAGNOSIS — H16.221: ICD-10-CM

## 2021-08-09 DIAGNOSIS — H16.222: ICD-10-CM

## 2021-08-09 PROCEDURE — 83861 MICROFLUID ANALY TEARS: CPT | Performed by: OPHTHALMOLOGY

## 2021-08-09 PROCEDURE — 92201 OPSCPY EXTND RTA DRAW UNI/BI: CPT | Performed by: OPHTHALMOLOGY

## 2021-08-09 PROCEDURE — 92134 CPTRZ OPH DX IMG PST SGM RTA: CPT | Performed by: OPHTHALMOLOGY

## 2021-08-09 PROCEDURE — 92014 COMPRE OPH EXAM EST PT 1/>: CPT | Performed by: OPHTHALMOLOGY

## 2021-08-09 ASSESSMENT — VISUAL ACUITY
OS_BCVA: 20/20-1
OD_BCVA: 20/20

## 2021-08-09 ASSESSMENT — REFRACTION_AUTOREFRACTION
OS_CYLINDER: -1.25
OD_CYLINDER: -0.75
OD_AXIS: 86
OS_SPHERE: +1.00
OS_AXIS: 60
OD_SPHERE: +0.25

## 2021-08-09 ASSESSMENT — SPHEQUIV_DERIVED
OD_SPHEQUIV: -0.125
OS_SPHEQUIV: 0.375

## 2021-08-09 ASSESSMENT — CONFRONTATIONAL VISUAL FIELD TEST (CVF)
OS_FINDINGS: FULL
OD_FINDINGS: FULL

## 2021-08-09 ASSESSMENT — LID EXAM ASSESSMENTS
OS_BLEPHARITIS: T
OD_BLEPHARITIS: T

## 2021-08-09 ASSESSMENT — TEAR BREAK UP TIME (TBUT)
OS_TBUT: T
OD_TBUT: T

## 2021-08-30 ENCOUNTER — RX ONLY (RX ONLY)
Age: 66
End: 2021-08-30

## 2021-08-30 ENCOUNTER — DOCTOR'S OFFICE (OUTPATIENT)
Dept: URBAN - NONMETROPOLITAN AREA CLINIC 1 | Facility: CLINIC | Age: 66
Setting detail: OPHTHALMOLOGY
End: 2021-08-30
Payer: COMMERCIAL

## 2021-08-30 DIAGNOSIS — H34.8310: ICD-10-CM

## 2021-08-30 DIAGNOSIS — H35.81: ICD-10-CM

## 2021-08-30 PROCEDURE — 67028 INJECTION EYE DRUG: CPT | Performed by: OPHTHALMOLOGY

## 2021-08-30 PROCEDURE — 92250 FUNDUS PHOTOGRAPHY W/I&R: CPT | Performed by: OPHTHALMOLOGY

## 2021-08-30 PROCEDURE — 92235 FLUORESCEIN ANGRPH MLTIFRAME: CPT | Performed by: OPHTHALMOLOGY

## 2021-08-30 ASSESSMENT — TEAR BREAK UP TIME (TBUT)
OS_TBUT: T
OD_TBUT: T

## 2021-08-30 ASSESSMENT — CONFRONTATIONAL VISUAL FIELD TEST (CVF)
OS_FINDINGS: FULL
OD_FINDINGS: FULL

## 2021-08-30 ASSESSMENT — REFRACTION_AUTOREFRACTION
OD_CYLINDER: -0.75
OD_SPHERE: +0.25
OS_SPHERE: +1.00
OS_AXIS: 60
OS_CYLINDER: -1.25
OD_AXIS: 86

## 2021-08-30 ASSESSMENT — LID EXAM ASSESSMENTS
OS_BLEPHARITIS: T
OD_BLEPHARITIS: T

## 2021-08-30 ASSESSMENT — SPHEQUIV_DERIVED
OD_SPHEQUIV: -0.125
OS_SPHEQUIV: 0.375

## 2021-08-30 ASSESSMENT — VISUAL ACUITY
OS_BCVA: 20/20-2
OD_BCVA: 20/20

## 2021-10-18 ENCOUNTER — DOCTOR'S OFFICE (OUTPATIENT)
Dept: URBAN - NONMETROPOLITAN AREA CLINIC 1 | Facility: CLINIC | Age: 66
Setting detail: OPHTHALMOLOGY
End: 2021-10-18
Payer: COMMERCIAL

## 2021-10-18 DIAGNOSIS — H34.8310: ICD-10-CM

## 2021-10-18 DIAGNOSIS — H25.13: ICD-10-CM

## 2021-10-18 DIAGNOSIS — H35.81: ICD-10-CM

## 2021-10-18 DIAGNOSIS — H43.813: ICD-10-CM

## 2021-10-18 PROCEDURE — 92134 CPTRZ OPH DX IMG PST SGM RTA: CPT | Performed by: OPHTHALMOLOGY

## 2021-10-18 PROCEDURE — 92201 OPSCPY EXTND RTA DRAW UNI/BI: CPT | Performed by: OPHTHALMOLOGY

## 2021-10-18 PROCEDURE — 99214 OFFICE O/P EST MOD 30 MIN: CPT | Performed by: OPHTHALMOLOGY

## 2021-10-18 ASSESSMENT — VISUAL ACUITY
OS_BCVA: 20/20-2
OD_BCVA: 20/20-2

## 2021-10-18 ASSESSMENT — LID EXAM ASSESSMENTS
OD_BLEPHARITIS: T
OS_BLEPHARITIS: T

## 2021-10-18 ASSESSMENT — SPHEQUIV_DERIVED
OD_SPHEQUIV: -0.125
OS_SPHEQUIV: 0.375

## 2021-10-18 ASSESSMENT — TEAR BREAK UP TIME (TBUT)
OD_TBUT: T
OS_TBUT: T

## 2021-10-18 ASSESSMENT — REFRACTION_AUTOREFRACTION
OD_CYLINDER: -0.75
OS_SPHERE: +1.00
OD_AXIS: 86
OD_SPHERE: +0.25
OS_AXIS: 60
OS_CYLINDER: -1.25

## 2021-10-18 ASSESSMENT — CONFRONTATIONAL VISUAL FIELD TEST (CVF)
OD_FINDINGS: FULL
OS_FINDINGS: FULL

## 2021-11-15 ENCOUNTER — DOCTOR'S OFFICE (OUTPATIENT)
Dept: URBAN - NONMETROPOLITAN AREA CLINIC 1 | Facility: CLINIC | Age: 66
Setting detail: OPHTHALMOLOGY
End: 2021-11-15
Payer: COMMERCIAL

## 2021-11-15 DIAGNOSIS — H34.8310: ICD-10-CM

## 2021-11-15 PROCEDURE — 67028 INJECTION EYE DRUG: CPT | Performed by: OPHTHALMOLOGY

## 2021-11-15 ASSESSMENT — SPHEQUIV_DERIVED
OD_SPHEQUIV: -0.125
OS_SPHEQUIV: 0.375

## 2021-11-15 ASSESSMENT — VISUAL ACUITY
OS_BCVA: 20/20-2
OD_BCVA: 20/20-2

## 2021-11-15 ASSESSMENT — REFRACTION_AUTOREFRACTION
OD_SPHERE: +0.25
OD_CYLINDER: -0.75
OS_CYLINDER: -1.25
OS_AXIS: 60
OD_AXIS: 86
OS_SPHERE: +1.00

## 2021-11-29 ENCOUNTER — DOCTOR'S OFFICE (OUTPATIENT)
Dept: URBAN - NONMETROPOLITAN AREA CLINIC 1 | Facility: CLINIC | Age: 66
Setting detail: OPHTHALMOLOGY
End: 2021-11-29
Payer: COMMERCIAL

## 2021-11-29 DIAGNOSIS — H34.8310: ICD-10-CM

## 2021-11-29 DIAGNOSIS — H35.81: ICD-10-CM

## 2021-11-29 DIAGNOSIS — H16.223: ICD-10-CM

## 2021-11-29 DIAGNOSIS — H43.813: ICD-10-CM

## 2021-11-29 DIAGNOSIS — H25.13: ICD-10-CM

## 2021-11-29 PROCEDURE — 99214 OFFICE O/P EST MOD 30 MIN: CPT | Performed by: OPHTHALMOLOGY

## 2021-11-29 PROCEDURE — 92134 CPTRZ OPH DX IMG PST SGM RTA: CPT | Performed by: OPHTHALMOLOGY

## 2021-11-29 ASSESSMENT — SPHEQUIV_DERIVED
OS_SPHEQUIV: 0.375
OD_SPHEQUIV: -0.125

## 2021-11-29 ASSESSMENT — CONFRONTATIONAL VISUAL FIELD TEST (CVF)
OD_FINDINGS: FULL
OS_FINDINGS: FULL

## 2021-11-29 ASSESSMENT — REFRACTION_AUTOREFRACTION
OD_SPHERE: +0.25
OS_SPHERE: +1.00
OD_AXIS: 86
OD_CYLINDER: -0.75
OS_AXIS: 60
OS_CYLINDER: -1.25

## 2021-11-29 ASSESSMENT — TEAR BREAK UP TIME (TBUT)
OS_TBUT: T
OD_TBUT: T

## 2021-11-29 ASSESSMENT — VISUAL ACUITY
OD_BCVA: 20/20
OS_BCVA: 20/25-2

## 2021-11-29 ASSESSMENT — LID EXAM ASSESSMENTS
OD_BLEPHARITIS: T
OS_BLEPHARITIS: T

## 2021-12-20 ENCOUNTER — DOCTOR'S OFFICE (OUTPATIENT)
Dept: URBAN - NONMETROPOLITAN AREA CLINIC 1 | Facility: CLINIC | Age: 66
Setting detail: OPHTHALMOLOGY
End: 2021-12-20
Payer: COMMERCIAL

## 2021-12-20 DIAGNOSIS — H34.8310: ICD-10-CM

## 2021-12-20 DIAGNOSIS — H35.81: ICD-10-CM

## 2021-12-20 PROCEDURE — 92235 FLUORESCEIN ANGRPH MLTIFRAME: CPT | Performed by: OPHTHALMOLOGY

## 2021-12-20 PROCEDURE — 67028 INJECTION EYE DRUG: CPT | Performed by: OPHTHALMOLOGY

## 2021-12-20 PROCEDURE — 92250 FUNDUS PHOTOGRAPHY W/I&R: CPT | Performed by: OPHTHALMOLOGY

## 2021-12-20 ASSESSMENT — CONFRONTATIONAL VISUAL FIELD TEST (CVF)
OS_FINDINGS: FULL
OD_FINDINGS: FULL

## 2021-12-20 ASSESSMENT — SPHEQUIV_DERIVED
OD_SPHEQUIV: -0.125
OS_SPHEQUIV: 0.375

## 2021-12-20 ASSESSMENT — REFRACTION_AUTOREFRACTION
OS_AXIS: 60
OD_AXIS: 86
OS_SPHERE: +1.00
OD_SPHERE: +0.25
OS_CYLINDER: -1.25
OD_CYLINDER: -0.75

## 2021-12-20 ASSESSMENT — VISUAL ACUITY
OD_BCVA: 20/20
OS_BCVA: 20/20-2

## 2022-01-10 ENCOUNTER — DOCTOR'S OFFICE (OUTPATIENT)
Dept: URBAN - NONMETROPOLITAN AREA CLINIC 1 | Facility: CLINIC | Age: 67
Setting detail: OPHTHALMOLOGY
End: 2022-01-10
Payer: COMMERCIAL

## 2022-01-10 DIAGNOSIS — H35.81: ICD-10-CM

## 2022-01-10 DIAGNOSIS — H43.813: ICD-10-CM

## 2022-01-10 DIAGNOSIS — H16.223: ICD-10-CM

## 2022-01-10 DIAGNOSIS — H34.8310: ICD-10-CM

## 2022-01-10 DIAGNOSIS — H25.13: ICD-10-CM

## 2022-01-10 PROCEDURE — 92134 CPTRZ OPH DX IMG PST SGM RTA: CPT | Performed by: OPHTHALMOLOGY

## 2022-01-10 PROCEDURE — 99214 OFFICE O/P EST MOD 30 MIN: CPT | Performed by: OPHTHALMOLOGY

## 2022-01-10 PROCEDURE — 92202 OPSCPY EXTND ON/MAC DRAW: CPT | Performed by: OPHTHALMOLOGY

## 2022-01-10 ASSESSMENT — REFRACTION_AUTOREFRACTION
OD_AXIS: 86
OD_SPHERE: +0.25
OS_AXIS: 60
OS_SPHERE: +1.00
OS_CYLINDER: -1.25
OD_CYLINDER: -0.75

## 2022-01-10 ASSESSMENT — TEAR BREAK UP TIME (TBUT)
OS_TBUT: T
OD_TBUT: T

## 2022-01-10 ASSESSMENT — SPHEQUIV_DERIVED
OD_SPHEQUIV: -0.125
OS_SPHEQUIV: 0.375

## 2022-01-10 ASSESSMENT — VISUAL ACUITY
OS_BCVA: 20/20-2
OD_BCVA: 20/20

## 2022-01-10 ASSESSMENT — LID EXAM ASSESSMENTS
OS_BLEPHARITIS: T
OD_BLEPHARITIS: T

## 2022-01-10 ASSESSMENT — CONFRONTATIONAL VISUAL FIELD TEST (CVF)
OS_FINDINGS: FULL
OD_FINDINGS: FULL

## 2022-01-31 ENCOUNTER — DOCTOR'S OFFICE (OUTPATIENT)
Dept: URBAN - NONMETROPOLITAN AREA CLINIC 1 | Facility: CLINIC | Age: 67
Setting detail: OPHTHALMOLOGY
End: 2022-01-31
Payer: COMMERCIAL

## 2022-01-31 DIAGNOSIS — H34.8310: ICD-10-CM

## 2022-01-31 PROCEDURE — 67028 INJECTION EYE DRUG: CPT | Performed by: OPHTHALMOLOGY

## 2022-01-31 ASSESSMENT — SPHEQUIV_DERIVED
OS_SPHEQUIV: 0.375
OD_SPHEQUIV: -0.125

## 2022-01-31 ASSESSMENT — VISUAL ACUITY
OD_BCVA: 20/20-2
OS_BCVA: 20/20-2

## 2022-01-31 ASSESSMENT — REFRACTION_AUTOREFRACTION
OS_CYLINDER: -1.25
OD_SPHERE: +0.25
OS_SPHERE: +1.00
OS_AXIS: 60
OD_CYLINDER: -0.75
OD_AXIS: 86

## 2022-02-14 ENCOUNTER — DOCTOR'S OFFICE (OUTPATIENT)
Dept: URBAN - NONMETROPOLITAN AREA CLINIC 1 | Facility: CLINIC | Age: 67
Setting detail: OPHTHALMOLOGY
End: 2022-02-14
Payer: COMMERCIAL

## 2022-02-14 DIAGNOSIS — H43.813: ICD-10-CM

## 2022-02-14 DIAGNOSIS — H16.223: ICD-10-CM

## 2022-02-14 DIAGNOSIS — H34.8310: ICD-10-CM

## 2022-02-14 DIAGNOSIS — H35.81: ICD-10-CM

## 2022-02-14 DIAGNOSIS — H25.13: ICD-10-CM

## 2022-02-14 PROCEDURE — 83861 MICROFLUID ANALY TEARS: CPT | Performed by: OPHTHALMOLOGY

## 2022-02-14 PROCEDURE — 92134 CPTRZ OPH DX IMG PST SGM RTA: CPT | Performed by: OPHTHALMOLOGY

## 2022-02-14 PROCEDURE — 99213 OFFICE O/P EST LOW 20 MIN: CPT | Performed by: OPHTHALMOLOGY

## 2022-02-14 ASSESSMENT — CONFRONTATIONAL VISUAL FIELD TEST (CVF)
OD_FINDINGS: FULL
OS_FINDINGS: FULL

## 2022-02-14 ASSESSMENT — REFRACTION_AUTOREFRACTION
OD_CYLINDER: -0.75
OD_SPHERE: +0.25
OS_CYLINDER: -1.25
OS_SPHERE: +1.00
OS_AXIS: 60
OD_AXIS: 86

## 2022-02-14 ASSESSMENT — LID EXAM ASSESSMENTS
OS_BLEPHARITIS: T
OD_BLEPHARITIS: T

## 2022-02-14 ASSESSMENT — VISUAL ACUITY
OD_BCVA: 20/25+3
OS_BCVA: 20/25+2

## 2022-02-14 ASSESSMENT — SPHEQUIV_DERIVED
OD_SPHEQUIV: -0.125
OS_SPHEQUIV: 0.375

## 2022-02-14 ASSESSMENT — TEAR BREAK UP TIME (TBUT)
OS_TBUT: T
OD_TBUT: T

## 2022-03-07 ENCOUNTER — DOCTOR'S OFFICE (OUTPATIENT)
Dept: URBAN - NONMETROPOLITAN AREA CLINIC 1 | Facility: CLINIC | Age: 67
Setting detail: OPHTHALMOLOGY
End: 2022-03-07
Payer: COMMERCIAL

## 2022-03-07 DIAGNOSIS — H34.8310: ICD-10-CM

## 2022-03-07 PROCEDURE — 67028 INJECTION EYE DRUG: CPT | Performed by: OPHTHALMOLOGY

## 2022-03-08 ASSESSMENT — SPHEQUIV_DERIVED
OS_SPHEQUIV: 0.375
OD_SPHEQUIV: -0.125

## 2022-03-08 ASSESSMENT — REFRACTION_AUTOREFRACTION
OS_CYLINDER: -1.25
OS_SPHERE: +1.00
OD_AXIS: 86
OD_SPHERE: +0.25
OD_CYLINDER: -0.75
OS_AXIS: 60

## 2022-03-08 ASSESSMENT — VISUAL ACUITY
OS_BCVA: 20/20
OD_BCVA: 20/20-2

## 2022-03-14 ENCOUNTER — DOCTOR'S OFFICE (OUTPATIENT)
Dept: URBAN - NONMETROPOLITAN AREA CLINIC 1 | Facility: CLINIC | Age: 67
Setting detail: OPHTHALMOLOGY
End: 2022-03-14
Payer: COMMERCIAL

## 2022-03-14 DIAGNOSIS — H35.81: ICD-10-CM

## 2022-03-14 DIAGNOSIS — H25.13: ICD-10-CM

## 2022-03-14 DIAGNOSIS — H43.813: ICD-10-CM

## 2022-03-14 DIAGNOSIS — H34.8310: ICD-10-CM

## 2022-03-14 PROCEDURE — 92134 CPTRZ OPH DX IMG PST SGM RTA: CPT | Performed by: OPHTHALMOLOGY

## 2022-03-14 PROCEDURE — 99213 OFFICE O/P EST LOW 20 MIN: CPT | Performed by: OPHTHALMOLOGY

## 2022-03-14 ASSESSMENT — REFRACTION_AUTOREFRACTION
OS_SPHERE: +1.00
OS_CYLINDER: -1.25
OS_AXIS: 60
OD_SPHERE: +0.25
OD_AXIS: 86
OD_CYLINDER: -0.75

## 2022-03-14 ASSESSMENT — SPHEQUIV_DERIVED
OS_SPHEQUIV: 0.375
OD_SPHEQUIV: -0.125

## 2022-03-14 ASSESSMENT — TEAR BREAK UP TIME (TBUT)
OD_TBUT: T
OS_TBUT: T

## 2022-03-14 ASSESSMENT — LID EXAM ASSESSMENTS
OS_BLEPHARITIS: T
OD_BLEPHARITIS: T

## 2022-03-14 ASSESSMENT — CONFRONTATIONAL VISUAL FIELD TEST (CVF)
OD_FINDINGS: FULL
OS_FINDINGS: FULL

## 2022-03-14 ASSESSMENT — VISUAL ACUITY
OD_BCVA: 20/20-1
OS_BCVA: 20/20-1

## 2022-04-11 ENCOUNTER — DOCTOR'S OFFICE (OUTPATIENT)
Dept: URBAN - NONMETROPOLITAN AREA CLINIC 1 | Facility: CLINIC | Age: 67
Setting detail: OPHTHALMOLOGY
End: 2022-04-11
Payer: COMMERCIAL

## 2022-04-11 DIAGNOSIS — H34.8310: ICD-10-CM

## 2022-04-11 PROCEDURE — 67028 INJECTION EYE DRUG: CPT | Performed by: OPHTHALMOLOGY

## 2022-04-11 ASSESSMENT — SPHEQUIV_DERIVED
OD_SPHEQUIV: -0.125
OS_SPHEQUIV: 0.375

## 2022-04-11 ASSESSMENT — REFRACTION_AUTOREFRACTION
OS_SPHERE: +1.00
OD_AXIS: 86
OD_CYLINDER: -0.75
OS_CYLINDER: -1.25
OD_SPHERE: +0.25
OS_AXIS: 60

## 2022-04-11 ASSESSMENT — CONFRONTATIONAL VISUAL FIELD TEST (CVF)
OD_FINDINGS: FULL
OS_FINDINGS: FULL

## 2022-04-11 ASSESSMENT — VISUAL ACUITY
OD_BCVA: 20/20-1
OS_BCVA: 20/25-1

## 2022-05-16 ENCOUNTER — DOCTOR'S OFFICE (OUTPATIENT)
Dept: URBAN - NONMETROPOLITAN AREA CLINIC 1 | Facility: CLINIC | Age: 67
Setting detail: OPHTHALMOLOGY
End: 2022-05-16
Payer: COMMERCIAL

## 2022-05-16 DIAGNOSIS — H34.8310: ICD-10-CM

## 2022-05-16 DIAGNOSIS — H35.81: ICD-10-CM

## 2022-05-16 DIAGNOSIS — H43.813: ICD-10-CM

## 2022-05-16 PROCEDURE — 99213 OFFICE O/P EST LOW 20 MIN: CPT | Performed by: OPHTHALMOLOGY

## 2022-05-16 PROCEDURE — 92134 CPTRZ OPH DX IMG PST SGM RTA: CPT | Performed by: OPHTHALMOLOGY

## 2022-05-16 ASSESSMENT — TEAR BREAK UP TIME (TBUT)
OD_TBUT: T
OS_TBUT: T

## 2022-05-16 ASSESSMENT — REFRACTION_AUTOREFRACTION
OD_AXIS: 86
OS_AXIS: 60
OD_CYLINDER: -0.75
OS_SPHERE: +1.00
OS_CYLINDER: -1.25
OD_SPHERE: +0.25

## 2022-05-16 ASSESSMENT — LID EXAM ASSESSMENTS
OS_BLEPHARITIS: T
OD_BLEPHARITIS: T

## 2022-05-16 ASSESSMENT — VISUAL ACUITY
OD_BCVA: 20/25+2
OS_BCVA: 20/25-1

## 2022-05-16 ASSESSMENT — CONFRONTATIONAL VISUAL FIELD TEST (CVF)
OS_FINDINGS: FULL
OD_FINDINGS: FULL

## 2022-05-16 ASSESSMENT — SPHEQUIV_DERIVED
OD_SPHEQUIV: -0.125
OS_SPHEQUIV: 0.375

## 2022-06-06 ENCOUNTER — RX ONLY (RX ONLY)
Age: 67
End: 2022-06-06

## 2022-06-06 ENCOUNTER — DOCTOR'S OFFICE (OUTPATIENT)
Dept: URBAN - NONMETROPOLITAN AREA CLINIC 1 | Facility: CLINIC | Age: 67
Setting detail: OPHTHALMOLOGY
End: 2022-06-06
Payer: COMMERCIAL

## 2022-06-06 DIAGNOSIS — H35.81: ICD-10-CM

## 2022-06-06 DIAGNOSIS — H34.8310: ICD-10-CM

## 2022-06-06 PROCEDURE — 67028 INJECTION EYE DRUG: CPT | Performed by: OPHTHALMOLOGY

## 2022-06-06 PROCEDURE — 92235 FLUORESCEIN ANGRPH MLTIFRAME: CPT | Performed by: OPHTHALMOLOGY

## 2022-06-06 PROCEDURE — 92250 FUNDUS PHOTOGRAPHY W/I&R: CPT | Performed by: OPHTHALMOLOGY

## 2022-06-06 ASSESSMENT — VISUAL ACUITY
OS_BCVA: 20/25-1
OD_BCVA: 20/20-1

## 2022-06-06 ASSESSMENT — REFRACTION_AUTOREFRACTION
OD_CYLINDER: -0.75
OS_AXIS: 60
OS_CYLINDER: -1.25
OD_SPHERE: +0.25
OD_AXIS: 86
OS_SPHERE: +1.00

## 2022-06-06 ASSESSMENT — SPHEQUIV_DERIVED
OD_SPHEQUIV: -0.125
OS_SPHEQUIV: 0.375

## 2022-06-20 ENCOUNTER — DOCTOR'S OFFICE (OUTPATIENT)
Dept: URBAN - NONMETROPOLITAN AREA CLINIC 1 | Facility: CLINIC | Age: 67
Setting detail: OPHTHALMOLOGY
End: 2022-06-20
Payer: COMMERCIAL

## 2022-06-20 DIAGNOSIS — H43.813: ICD-10-CM

## 2022-06-20 DIAGNOSIS — H25.13: ICD-10-CM

## 2022-06-20 DIAGNOSIS — H35.81: ICD-10-CM

## 2022-06-20 DIAGNOSIS — H34.8310: ICD-10-CM

## 2022-06-20 PROCEDURE — 99213 OFFICE O/P EST LOW 20 MIN: CPT | Performed by: OPHTHALMOLOGY

## 2022-06-20 PROCEDURE — 92134 CPTRZ OPH DX IMG PST SGM RTA: CPT | Performed by: OPHTHALMOLOGY

## 2022-06-20 PROCEDURE — 92202 OPSCPY EXTND ON/MAC DRAW: CPT | Performed by: OPHTHALMOLOGY

## 2022-06-20 ASSESSMENT — CONFRONTATIONAL VISUAL FIELD TEST (CVF)
OS_FINDINGS: FULL
OD_FINDINGS: FULL

## 2022-06-20 ASSESSMENT — REFRACTION_AUTOREFRACTION
OD_SPHERE: +0.25
OS_AXIS: 60
OD_AXIS: 86
OD_CYLINDER: -0.75
OS_SPHERE: +1.00
OS_CYLINDER: -1.25

## 2022-06-20 ASSESSMENT — LID EXAM ASSESSMENTS
OS_BLEPHARITIS: T
OD_BLEPHARITIS: T

## 2022-06-20 ASSESSMENT — TEAR BREAK UP TIME (TBUT)
OD_TBUT: T
OS_TBUT: T

## 2022-06-20 ASSESSMENT — SPHEQUIV_DERIVED
OS_SPHEQUIV: 0.375
OD_SPHEQUIV: -0.125

## 2022-06-20 ASSESSMENT — VISUAL ACUITY
OD_BCVA: 20/20-2
OS_BCVA: 20/25

## 2022-07-25 ENCOUNTER — DOCTOR'S OFFICE (OUTPATIENT)
Dept: URBAN - NONMETROPOLITAN AREA CLINIC 1 | Facility: CLINIC | Age: 67
Setting detail: OPHTHALMOLOGY
End: 2022-07-25
Payer: COMMERCIAL

## 2022-07-25 DIAGNOSIS — H34.8310: ICD-10-CM

## 2022-07-25 PROCEDURE — 67028 INJECTION EYE DRUG: CPT | Performed by: OPHTHALMOLOGY

## 2022-07-25 ASSESSMENT — SPHEQUIV_DERIVED
OD_SPHEQUIV: -0.125
OS_SPHEQUIV: 0.375

## 2022-07-25 ASSESSMENT — REFRACTION_AUTOREFRACTION
OD_AXIS: 86
OS_CYLINDER: -1.25
OS_AXIS: 60
OS_SPHERE: +1.00
OD_CYLINDER: -0.75
OD_SPHERE: +0.25

## 2022-07-25 ASSESSMENT — VISUAL ACUITY
OD_BCVA: 20/20-2
OS_BCVA: 20/20

## 2022-08-08 ENCOUNTER — DOCTOR'S OFFICE (OUTPATIENT)
Dept: URBAN - NONMETROPOLITAN AREA CLINIC 1 | Facility: CLINIC | Age: 67
Setting detail: OPHTHALMOLOGY
End: 2022-08-08
Payer: COMMERCIAL

## 2022-08-08 DIAGNOSIS — H25.13: ICD-10-CM

## 2022-08-08 DIAGNOSIS — H34.8310: ICD-10-CM

## 2022-08-08 DIAGNOSIS — H43.813: ICD-10-CM

## 2022-08-08 DIAGNOSIS — H35.81: ICD-10-CM

## 2022-08-08 DIAGNOSIS — H16.223: ICD-10-CM

## 2022-08-08 PROCEDURE — 99213 OFFICE O/P EST LOW 20 MIN: CPT | Performed by: OPHTHALMOLOGY

## 2022-08-08 PROCEDURE — 92134 CPTRZ OPH DX IMG PST SGM RTA: CPT | Performed by: OPHTHALMOLOGY

## 2022-08-08 ASSESSMENT — REFRACTION_AUTOREFRACTION
OS_SPHERE: +1.00
OD_CYLINDER: -0.75
OS_CYLINDER: -1.25
OD_AXIS: 86
OD_SPHERE: +0.25
OS_AXIS: 60

## 2022-08-08 ASSESSMENT — SPHEQUIV_DERIVED
OS_SPHEQUIV: 0.375
OD_SPHEQUIV: -0.125

## 2022-08-08 ASSESSMENT — TEAR BREAK UP TIME (TBUT)
OS_TBUT: T
OD_TBUT: T

## 2022-08-08 ASSESSMENT — LID EXAM ASSESSMENTS
OD_BLEPHARITIS: T
OS_BLEPHARITIS: T

## 2022-08-08 ASSESSMENT — VISUAL ACUITY
OD_BCVA: 20/20-1
OS_BCVA: 20/20-2

## 2022-08-08 ASSESSMENT — CONFRONTATIONAL VISUAL FIELD TEST (CVF)
OD_FINDINGS: FULL
OS_FINDINGS: FULL

## 2022-09-12 ENCOUNTER — DOCTOR'S OFFICE (OUTPATIENT)
Dept: URBAN - NONMETROPOLITAN AREA CLINIC 1 | Facility: CLINIC | Age: 67
Setting detail: OPHTHALMOLOGY
End: 2022-09-12
Payer: COMMERCIAL

## 2022-09-12 DIAGNOSIS — H34.8310: ICD-10-CM

## 2022-09-12 PROCEDURE — 92134 CPTRZ OPH DX IMG PST SGM RTA: CPT | Performed by: OPHTHALMOLOGY

## 2022-09-12 PROCEDURE — 67028 INJECTION EYE DRUG: CPT | Performed by: OPHTHALMOLOGY

## 2022-09-12 ASSESSMENT — REFRACTION_AUTOREFRACTION
OS_AXIS: 60
OD_CYLINDER: -0.75
OD_SPHERE: +0.25
OS_CYLINDER: -1.25
OD_AXIS: 86
OS_SPHERE: +1.00

## 2022-09-12 ASSESSMENT — VISUAL ACUITY
OS_BCVA: 20/20
OD_BCVA: 20/20-1

## 2022-09-12 ASSESSMENT — SPHEQUIV_DERIVED
OD_SPHEQUIV: -0.125
OS_SPHEQUIV: 0.375

## 2022-09-12 ASSESSMENT — CONFRONTATIONAL VISUAL FIELD TEST (CVF)
OD_FINDINGS: FULL
OS_FINDINGS: FULL

## 2022-10-10 ENCOUNTER — DOCTOR'S OFFICE (OUTPATIENT)
Dept: URBAN - NONMETROPOLITAN AREA CLINIC 1 | Facility: CLINIC | Age: 67
Setting detail: OPHTHALMOLOGY
End: 2022-10-10
Payer: COMMERCIAL

## 2022-10-10 DIAGNOSIS — H34.8310: ICD-10-CM

## 2022-10-10 PROBLEM — H25.11 CATARACT NUCLEAR SCLEROSIS AGE RELATED; RIGHT EYE, LEFT EYE: Status: ACTIVE | Noted: 2018-09-24

## 2022-10-10 PROBLEM — H52.223 ASTIGMATISM, REGULAR; BOTH EYES: Status: ACTIVE | Noted: 2019-12-02

## 2022-10-10 PROBLEM — H16.222 KERATOCONJUNCTIVITIS SICCA; RIGHT EYE, LEFT EYE: Status: ACTIVE | Noted: 2018-09-24

## 2022-10-10 PROBLEM — H25.12 CATARACT NUCLEAR SCLEROSIS AGE RELATED; RIGHT EYE, LEFT EYE: Status: ACTIVE | Noted: 2018-09-24

## 2022-10-10 PROBLEM — H43.811 POST VITREOUS DETACHMENT; RIGHT EYE, LEFT EYE: Status: ACTIVE | Noted: 2017-04-24

## 2022-10-10 PROBLEM — H43.812 POST VITREOUS DETACHMENT; RIGHT EYE, LEFT EYE: Status: ACTIVE | Noted: 2017-04-24

## 2022-10-10 PROBLEM — H16.221 KERATOCONJUNCTIVITIS SICCA; RIGHT EYE, LEFT EYE: Status: ACTIVE | Noted: 2018-09-24

## 2022-10-10 PROBLEM — H35.81 MACULAR EDEMA: Status: ACTIVE | Noted: 2020-12-14

## 2022-10-10 PROCEDURE — 67028 INJECTION EYE DRUG: CPT | Performed by: OPHTHALMOLOGY

## 2022-10-10 ASSESSMENT — REFRACTION_AUTOREFRACTION
OD_CYLINDER: -0.75
OD_SPHERE: +0.25
OS_SPHERE: +1.00
OS_CYLINDER: -1.25
OS_AXIS: 60
OD_AXIS: 86

## 2022-10-10 ASSESSMENT — VISUAL ACUITY
OS_BCVA: 20/20
OD_BCVA: 20/20-1

## 2022-10-10 ASSESSMENT — SPHEQUIV_DERIVED
OS_SPHEQUIV: 0.375
OD_SPHEQUIV: -0.125

## 2022-11-14 ENCOUNTER — DOCTOR'S OFFICE (OUTPATIENT)
Dept: URBAN - NONMETROPOLITAN AREA CLINIC 1 | Facility: CLINIC | Age: 67
Setting detail: OPHTHALMOLOGY
End: 2022-11-14
Payer: COMMERCIAL

## 2022-11-14 DIAGNOSIS — H35.81: ICD-10-CM

## 2022-11-14 DIAGNOSIS — H43.813: ICD-10-CM

## 2022-11-14 DIAGNOSIS — H25.13: ICD-10-CM

## 2022-11-14 DIAGNOSIS — H34.8310: ICD-10-CM

## 2022-11-14 PROCEDURE — 92134 CPTRZ OPH DX IMG PST SGM RTA: CPT | Performed by: OPHTHALMOLOGY

## 2022-11-14 PROCEDURE — 92014 COMPRE OPH EXAM EST PT 1/>: CPT | Performed by: OPHTHALMOLOGY

## 2022-11-14 ASSESSMENT — REFRACTION_AUTOREFRACTION
OD_SPHERE: +0.25
OD_AXIS: 86
OS_SPHERE: +1.00
OD_CYLINDER: -0.75
OS_AXIS: 60
OS_CYLINDER: -1.25

## 2022-11-14 ASSESSMENT — TEAR BREAK UP TIME (TBUT)
OS_TBUT: T
OD_TBUT: T

## 2022-11-14 ASSESSMENT — VISUAL ACUITY
OS_BCVA: 20/20-1
OD_BCVA: 20/20-2

## 2022-11-14 ASSESSMENT — SPHEQUIV_DERIVED
OS_SPHEQUIV: 0.375
OD_SPHEQUIV: -0.125

## 2022-11-14 ASSESSMENT — CONFRONTATIONAL VISUAL FIELD TEST (CVF)
OD_FINDINGS: FULL
OS_FINDINGS: FULL

## 2022-11-14 ASSESSMENT — LID EXAM ASSESSMENTS
OD_BLEPHARITIS: T
OS_BLEPHARITIS: T

## 2022-12-05 ENCOUNTER — DOCTOR'S OFFICE (OUTPATIENT)
Dept: URBAN - NONMETROPOLITAN AREA CLINIC 1 | Facility: CLINIC | Age: 67
Setting detail: OPHTHALMOLOGY
End: 2022-12-05
Payer: COMMERCIAL

## 2022-12-05 ENCOUNTER — RX ONLY (RX ONLY)
Age: 67
End: 2022-12-05

## 2022-12-05 DIAGNOSIS — H34.8310: ICD-10-CM

## 2022-12-05 PROCEDURE — 67028 INJECTION EYE DRUG: CPT | Performed by: OPHTHALMOLOGY

## 2022-12-05 ASSESSMENT — VISUAL ACUITY
OD_BCVA: 20/20-2
OS_BCVA: 20/20

## 2022-12-05 ASSESSMENT — REFRACTION_AUTOREFRACTION
OS_CYLINDER: -1.25
OS_SPHERE: +1.00
OD_AXIS: 86
OS_AXIS: 60
OD_SPHERE: +0.25
OD_CYLINDER: -0.75

## 2022-12-05 ASSESSMENT — SPHEQUIV_DERIVED
OS_SPHEQUIV: 0.375
OD_SPHEQUIV: -0.125

## 2022-12-12 ENCOUNTER — DOCTOR'S OFFICE (OUTPATIENT)
Dept: URBAN - NONMETROPOLITAN AREA CLINIC 1 | Facility: CLINIC | Age: 67
Setting detail: OPHTHALMOLOGY
End: 2022-12-12
Payer: COMMERCIAL

## 2022-12-12 VITALS — HEIGHT: 60 IN

## 2022-12-12 DIAGNOSIS — H35.81: ICD-10-CM

## 2022-12-12 DIAGNOSIS — H43.813: ICD-10-CM

## 2022-12-12 DIAGNOSIS — H25.13: ICD-10-CM

## 2022-12-12 DIAGNOSIS — H34.8310: ICD-10-CM

## 2022-12-12 PROCEDURE — 99213 OFFICE O/P EST LOW 20 MIN: CPT | Performed by: OPHTHALMOLOGY

## 2022-12-12 PROCEDURE — 92134 CPTRZ OPH DX IMG PST SGM RTA: CPT | Performed by: OPHTHALMOLOGY

## 2022-12-12 ASSESSMENT — CONFRONTATIONAL VISUAL FIELD TEST (CVF)
OD_FINDINGS: FULL
OS_FINDINGS: FULL

## 2022-12-12 ASSESSMENT — LID EXAM ASSESSMENTS
OD_BLEPHARITIS: T
OS_BLEPHARITIS: T

## 2022-12-12 ASSESSMENT — SPHEQUIV_DERIVED
OD_SPHEQUIV: -0.125
OS_SPHEQUIV: 0.375

## 2022-12-12 ASSESSMENT — REFRACTION_AUTOREFRACTION
OD_AXIS: 86
OS_AXIS: 60
OS_SPHERE: +1.00
OS_CYLINDER: -1.25
OD_SPHERE: +0.25
OD_CYLINDER: -0.75

## 2022-12-12 ASSESSMENT — TEAR BREAK UP TIME (TBUT)
OS_TBUT: T
OD_TBUT: T

## 2022-12-12 ASSESSMENT — VISUAL ACUITY
OS_BCVA: 20/20
OD_BCVA: 20/20

## 2023-01-16 ENCOUNTER — DOCTOR'S OFFICE (OUTPATIENT)
Dept: URBAN - NONMETROPOLITAN AREA CLINIC 1 | Facility: CLINIC | Age: 68
Setting detail: OPHTHALMOLOGY
End: 2023-01-16
Payer: COMMERCIAL

## 2023-01-16 DIAGNOSIS — H34.8310: ICD-10-CM

## 2023-01-16 PROCEDURE — 92134 CPTRZ OPH DX IMG PST SGM RTA: CPT | Performed by: OPHTHALMOLOGY

## 2023-01-16 PROCEDURE — 67028 INJECTION EYE DRUG: CPT | Performed by: OPHTHALMOLOGY

## 2023-01-16 ASSESSMENT — REFRACTION_AUTOREFRACTION
OS_AXIS: 60
OD_AXIS: 86
OD_CYLINDER: -0.75
OS_CYLINDER: -1.25
OD_SPHERE: +0.25
OS_SPHERE: +1.00

## 2023-01-16 ASSESSMENT — VISUAL ACUITY
OD_BCVA: 20/20-2
OS_BCVA: 20/20-1

## 2023-01-16 ASSESSMENT — SPHEQUIV_DERIVED
OS_SPHEQUIV: 0.375
OD_SPHEQUIV: -0.125

## 2023-01-16 ASSESSMENT — CONFRONTATIONAL VISUAL FIELD TEST (CVF)
OS_FINDINGS: FULL
OD_FINDINGS: FULL

## 2023-02-06 ENCOUNTER — DOCTOR'S OFFICE (OUTPATIENT)
Dept: URBAN - NONMETROPOLITAN AREA CLINIC 1 | Facility: CLINIC | Age: 68
Setting detail: OPHTHALMOLOGY
End: 2023-02-06
Payer: COMMERCIAL

## 2023-02-06 VITALS — HEIGHT: 60 IN

## 2023-02-06 DIAGNOSIS — H34.8310: ICD-10-CM

## 2023-02-06 DIAGNOSIS — H43.813: ICD-10-CM

## 2023-02-06 DIAGNOSIS — H35.81: ICD-10-CM

## 2023-02-06 PROCEDURE — 92235 FLUORESCEIN ANGRPH MLTIFRAME: CPT | Performed by: OPHTHALMOLOGY

## 2023-02-06 PROCEDURE — 92250 FUNDUS PHOTOGRAPHY W/I&R: CPT | Performed by: OPHTHALMOLOGY

## 2023-02-06 PROCEDURE — 99213 OFFICE O/P EST LOW 20 MIN: CPT | Performed by: OPHTHALMOLOGY

## 2023-02-06 ASSESSMENT — REFRACTION_AUTOREFRACTION
OS_SPHERE: +1.00
OD_AXIS: 86
OS_CYLINDER: -1.25
OD_CYLINDER: -0.75
OS_AXIS: 60
OD_SPHERE: +0.25

## 2023-02-06 ASSESSMENT — LID EXAM ASSESSMENTS
OD_BLEPHARITIS: T
OS_BLEPHARITIS: T

## 2023-02-06 ASSESSMENT — CONFRONTATIONAL VISUAL FIELD TEST (CVF)
OS_FINDINGS: FULL
OD_FINDINGS: FULL

## 2023-02-06 ASSESSMENT — SPHEQUIV_DERIVED
OD_SPHEQUIV: -0.125
OS_SPHEQUIV: 0.375

## 2023-02-06 ASSESSMENT — TEAR BREAK UP TIME (TBUT)
OD_TBUT: T
OS_TBUT: T

## 2023-02-06 ASSESSMENT — VISUAL ACUITY
OD_BCVA: 20/20
OS_BCVA: 20/20-2

## 2023-03-13 ENCOUNTER — DOCTOR'S OFFICE (OUTPATIENT)
Dept: URBAN - NONMETROPOLITAN AREA CLINIC 1 | Facility: CLINIC | Age: 68
Setting detail: OPHTHALMOLOGY
End: 2023-03-13
Payer: COMMERCIAL

## 2023-03-13 DIAGNOSIS — H34.8310: ICD-10-CM

## 2023-03-13 PROCEDURE — 67028 INJECTION EYE DRUG: CPT | Performed by: OPHTHALMOLOGY

## 2023-03-13 ASSESSMENT — VISUAL ACUITY
OS_BCVA: 20/20-1
OD_BCVA: 20/20

## 2023-03-13 ASSESSMENT — REFRACTION_AUTOREFRACTION
OS_CYLINDER: -1.25
OS_SPHERE: +1.00
OD_CYLINDER: -0.75
OD_AXIS: 86
OS_AXIS: 60
OD_SPHERE: +0.25

## 2023-03-13 ASSESSMENT — SPHEQUIV_DERIVED
OS_SPHEQUIV: 0.375
OD_SPHEQUIV: -0.125

## 2023-04-03 ENCOUNTER — DOCTOR'S OFFICE (OUTPATIENT)
Dept: URBAN - NONMETROPOLITAN AREA CLINIC 1 | Facility: CLINIC | Age: 68
Setting detail: OPHTHALMOLOGY
End: 2023-04-03
Payer: COMMERCIAL

## 2023-04-03 DIAGNOSIS — H35.81: ICD-10-CM

## 2023-04-03 DIAGNOSIS — H25.13: ICD-10-CM

## 2023-04-03 DIAGNOSIS — H43.813: ICD-10-CM

## 2023-04-03 DIAGNOSIS — H34.8310: ICD-10-CM

## 2023-04-03 PROCEDURE — 99213 OFFICE O/P EST LOW 20 MIN: CPT | Performed by: OPHTHALMOLOGY

## 2023-04-03 PROCEDURE — 92134 CPTRZ OPH DX IMG PST SGM RTA: CPT | Performed by: OPHTHALMOLOGY

## 2023-04-03 ASSESSMENT — REFRACTION_AUTOREFRACTION
OD_CYLINDER: -0.75
OS_CYLINDER: -1.25
OD_SPHERE: +0.25
OS_SPHERE: +1.00
OD_AXIS: 86
OS_AXIS: 60

## 2023-04-03 ASSESSMENT — VISUAL ACUITY
OS_BCVA: 20/20
OD_BCVA: 20/20

## 2023-04-03 ASSESSMENT — TEAR BREAK UP TIME (TBUT)
OS_TBUT: T
OD_TBUT: T

## 2023-04-03 ASSESSMENT — SPHEQUIV_DERIVED
OD_SPHEQUIV: -0.125
OS_SPHEQUIV: 0.375

## 2023-04-03 ASSESSMENT — LID EXAM ASSESSMENTS
OD_BLEPHARITIS: T
OS_BLEPHARITIS: T

## 2023-04-03 ASSESSMENT — CONFRONTATIONAL VISUAL FIELD TEST (CVF)
OD_FINDINGS: FULL
OS_FINDINGS: FULL

## 2023-05-22 ENCOUNTER — DOCTOR'S OFFICE (OUTPATIENT)
Dept: URBAN - NONMETROPOLITAN AREA CLINIC 1 | Facility: CLINIC | Age: 68
Setting detail: OPHTHALMOLOGY
End: 2023-05-22
Payer: COMMERCIAL

## 2023-05-22 DIAGNOSIS — H25.13: ICD-10-CM

## 2023-05-22 DIAGNOSIS — H43.813: ICD-10-CM

## 2023-05-22 DIAGNOSIS — H35.81: ICD-10-CM

## 2023-05-22 DIAGNOSIS — H34.8310: ICD-10-CM

## 2023-05-22 PROCEDURE — 99214 OFFICE O/P EST MOD 30 MIN: CPT | Performed by: OPHTHALMOLOGY

## 2023-05-22 PROCEDURE — 92134 CPTRZ OPH DX IMG PST SGM RTA: CPT | Performed by: OPHTHALMOLOGY

## 2023-05-22 ASSESSMENT — SPHEQUIV_DERIVED
OD_SPHEQUIV: -0.125
OS_SPHEQUIV: 0.375

## 2023-05-22 ASSESSMENT — REFRACTION_AUTOREFRACTION
OS_SPHERE: +1.00
OS_AXIS: 60
OD_AXIS: 86
OD_SPHERE: +0.25
OD_CYLINDER: -0.75
OS_CYLINDER: -1.25

## 2023-05-22 ASSESSMENT — LID EXAM ASSESSMENTS
OD_BLEPHARITIS: T
OS_BLEPHARITIS: T

## 2023-05-22 ASSESSMENT — TEAR BREAK UP TIME (TBUT)
OS_TBUT: T
OD_TBUT: T

## 2023-05-22 ASSESSMENT — VISUAL ACUITY
OS_BCVA: 20/20
OD_BCVA: 20/20-1

## 2023-05-22 ASSESSMENT — CONFRONTATIONAL VISUAL FIELD TEST (CVF)
OS_FINDINGS: FULL
OD_FINDINGS: FULL

## 2023-06-19 ENCOUNTER — DOCTOR'S OFFICE (OUTPATIENT)
Dept: URBAN - NONMETROPOLITAN AREA CLINIC 1 | Facility: CLINIC | Age: 68
Setting detail: OPHTHALMOLOGY
End: 2023-06-19
Payer: COMMERCIAL

## 2023-06-19 DIAGNOSIS — H25.13: ICD-10-CM

## 2023-06-19 DIAGNOSIS — H43.813: ICD-10-CM

## 2023-06-19 DIAGNOSIS — H34.8310: ICD-10-CM

## 2023-06-19 DIAGNOSIS — H35.81: ICD-10-CM

## 2023-06-19 PROCEDURE — 92134 CPTRZ OPH DX IMG PST SGM RTA: CPT | Performed by: OPHTHALMOLOGY

## 2023-06-19 PROCEDURE — 99214 OFFICE O/P EST MOD 30 MIN: CPT | Performed by: OPHTHALMOLOGY

## 2023-06-19 ASSESSMENT — REFRACTION_AUTOREFRACTION
OS_SPHERE: +1.00
OS_AXIS: 60
OD_CYLINDER: -0.75
OD_AXIS: 86
OD_SPHERE: +0.25
OS_CYLINDER: -1.25

## 2023-06-19 ASSESSMENT — TEAR BREAK UP TIME (TBUT)
OS_TBUT: T
OD_TBUT: T

## 2023-06-19 ASSESSMENT — VISUAL ACUITY
OD_BCVA: 20/20-1
OS_BCVA: 20/25+2

## 2023-06-19 ASSESSMENT — LID EXAM ASSESSMENTS
OS_BLEPHARITIS: T
OD_BLEPHARITIS: T

## 2023-06-19 ASSESSMENT — CONFRONTATIONAL VISUAL FIELD TEST (CVF)
OS_FINDINGS: FULL
OD_FINDINGS: FULL

## 2023-06-19 ASSESSMENT — SPHEQUIV_DERIVED
OS_SPHEQUIV: 0.375
OD_SPHEQUIV: -0.125

## 2023-07-10 ENCOUNTER — DOCTOR'S OFFICE (OUTPATIENT)
Dept: URBAN - NONMETROPOLITAN AREA CLINIC 1 | Facility: CLINIC | Age: 68
Setting detail: OPHTHALMOLOGY
End: 2023-07-10
Payer: COMMERCIAL

## 2023-07-10 DIAGNOSIS — H34.8310: ICD-10-CM

## 2023-07-10 PROCEDURE — 67028 INJECTION EYE DRUG: CPT | Performed by: OPHTHALMOLOGY

## 2023-07-10 ASSESSMENT — REFRACTION_AUTOREFRACTION
OD_CYLINDER: -0.75
OD_SPHERE: +0.25
OS_AXIS: 60
OD_AXIS: 86
OS_SPHERE: +1.00
OS_CYLINDER: -1.25

## 2023-07-10 ASSESSMENT — SPHEQUIV_DERIVED
OS_SPHEQUIV: 0.375
OD_SPHEQUIV: -0.125

## 2023-07-10 ASSESSMENT — VISUAL ACUITY
OD_BCVA: 20/20-1
OS_BCVA: 20/25

## 2023-07-17 ENCOUNTER — DOCTOR'S OFFICE (OUTPATIENT)
Dept: URBAN - NONMETROPOLITAN AREA CLINIC 1 | Facility: CLINIC | Age: 68
Setting detail: OPHTHALMOLOGY
End: 2023-07-17
Payer: COMMERCIAL

## 2023-07-17 DIAGNOSIS — H35.81: ICD-10-CM

## 2023-07-17 DIAGNOSIS — H34.8310: ICD-10-CM

## 2023-07-17 DIAGNOSIS — H43.813: ICD-10-CM

## 2023-07-17 DIAGNOSIS — H25.13: ICD-10-CM

## 2023-07-17 PROCEDURE — 99214 OFFICE O/P EST MOD 30 MIN: CPT | Performed by: OPHTHALMOLOGY

## 2023-07-17 PROCEDURE — 92202 OPSCPY EXTND ON/MAC DRAW: CPT | Performed by: OPHTHALMOLOGY

## 2023-07-17 PROCEDURE — 92134 CPTRZ OPH DX IMG PST SGM RTA: CPT | Performed by: OPHTHALMOLOGY

## 2023-07-17 ASSESSMENT — REFRACTION_AUTOREFRACTION
OS_SPHERE: +1.00
OD_SPHERE: +0.25
OD_AXIS: 86
OS_CYLINDER: -1.25
OD_CYLINDER: -0.75
OS_AXIS: 60

## 2023-07-17 ASSESSMENT — SPHEQUIV_DERIVED
OD_SPHEQUIV: -0.125
OS_SPHEQUIV: 0.375

## 2023-07-17 ASSESSMENT — VISUAL ACUITY
OS_BCVA: 20/20-1
OD_BCVA: 20/20

## 2023-07-17 ASSESSMENT — CONFRONTATIONAL VISUAL FIELD TEST (CVF)
OS_FINDINGS: FULL
OD_FINDINGS: FULL

## 2023-07-17 ASSESSMENT — TEAR BREAK UP TIME (TBUT)
OD_TBUT: T
OS_TBUT: T

## 2023-07-17 ASSESSMENT — LID EXAM ASSESSMENTS
OD_BLEPHARITIS: T
OS_BLEPHARITIS: T

## 2023-08-14 ENCOUNTER — DOCTOR'S OFFICE (OUTPATIENT)
Dept: URBAN - NONMETROPOLITAN AREA CLINIC 1 | Facility: CLINIC | Age: 68
Setting detail: OPHTHALMOLOGY
End: 2023-08-14
Payer: COMMERCIAL

## 2023-08-14 DIAGNOSIS — H34.8310: ICD-10-CM

## 2023-08-14 PROCEDURE — 67028 INJECTION EYE DRUG: CPT | Performed by: OPHTHALMOLOGY

## 2023-08-14 ASSESSMENT — SPHEQUIV_DERIVED
OD_SPHEQUIV: -0.125
OS_SPHEQUIV: 0.375

## 2023-08-14 ASSESSMENT — REFRACTION_AUTOREFRACTION
OS_AXIS: 60
OD_CYLINDER: -0.75
OD_SPHERE: +0.25
OS_SPHERE: +1.00
OD_AXIS: 86
OS_CYLINDER: -1.25

## 2023-08-14 ASSESSMENT — VISUAL ACUITY
OS_BCVA: 20/20-1
OD_BCVA: 20/20

## 2023-09-25 ENCOUNTER — DOCTOR'S OFFICE (OUTPATIENT)
Dept: URBAN - NONMETROPOLITAN AREA CLINIC 1 | Facility: CLINIC | Age: 68
Setting detail: OPHTHALMOLOGY
End: 2023-09-25
Payer: COMMERCIAL

## 2023-09-25 DIAGNOSIS — H35.81: ICD-10-CM

## 2023-09-25 DIAGNOSIS — H34.8310: ICD-10-CM

## 2023-09-25 PROCEDURE — 92134 CPTRZ OPH DX IMG PST SGM RTA: CPT | Performed by: OPHTHALMOLOGY

## 2023-09-25 PROCEDURE — 99213 OFFICE O/P EST LOW 20 MIN: CPT | Performed by: OPHTHALMOLOGY

## 2023-09-25 ASSESSMENT — REFRACTION_AUTOREFRACTION
OS_CYLINDER: -1.25
OS_AXIS: 60
OD_SPHERE: +0.25
OD_AXIS: 86
OS_SPHERE: +1.00
OD_CYLINDER: -0.75

## 2023-09-25 ASSESSMENT — VISUAL ACUITY
OS_BCVA: 20/20
OD_BCVA: 20/20

## 2023-09-25 ASSESSMENT — CONFRONTATIONAL VISUAL FIELD TEST (CVF)
OS_FINDINGS: FULL
OD_FINDINGS: FULL

## 2023-09-25 ASSESSMENT — TEAR BREAK UP TIME (TBUT)
OS_TBUT: T
OD_TBUT: T

## 2023-09-25 ASSESSMENT — SPHEQUIV_DERIVED
OS_SPHEQUIV: 0.375
OD_SPHEQUIV: -0.125

## 2023-09-25 ASSESSMENT — LID EXAM ASSESSMENTS
OS_BLEPHARITIS: T
OD_BLEPHARITIS: T

## 2023-10-16 ENCOUNTER — DOCTOR'S OFFICE (OUTPATIENT)
Dept: URBAN - NONMETROPOLITAN AREA CLINIC 1 | Facility: CLINIC | Age: 68
Setting detail: OPHTHALMOLOGY
End: 2023-10-16
Payer: COMMERCIAL

## 2023-10-16 DIAGNOSIS — H34.8310: ICD-10-CM

## 2023-10-16 DIAGNOSIS — H35.81: ICD-10-CM

## 2023-10-16 PROCEDURE — 92235 FLUORESCEIN ANGRPH MLTIFRAME: CPT | Performed by: OPHTHALMOLOGY

## 2023-10-16 PROCEDURE — 99213 OFFICE O/P EST LOW 20 MIN: CPT | Performed by: OPHTHALMOLOGY

## 2023-10-16 PROCEDURE — 92250 FUNDUS PHOTOGRAPHY W/I&R: CPT | Performed by: OPHTHALMOLOGY

## 2023-10-16 ASSESSMENT — CONFRONTATIONAL VISUAL FIELD TEST (CVF)
OD_FINDINGS: FULL
OS_FINDINGS: FULL

## 2023-10-16 ASSESSMENT — TEAR BREAK UP TIME (TBUT)
OS_TBUT: T
OD_TBUT: T

## 2023-10-16 ASSESSMENT — LID EXAM ASSESSMENTS
OS_BLEPHARITIS: T
OD_BLEPHARITIS: T

## 2023-10-17 ASSESSMENT — VISUAL ACUITY
OD_BCVA: 20/25+1
OS_BCVA: 20/25+2

## 2023-10-17 ASSESSMENT — SPHEQUIV_DERIVED
OS_SPHEQUIV: 0.375
OD_SPHEQUIV: -0.125

## 2023-10-17 ASSESSMENT — REFRACTION_AUTOREFRACTION
OD_SPHERE: +0.25
OD_CYLINDER: -0.75
OS_AXIS: 60
OS_CYLINDER: -1.25
OS_SPHERE: +1.00
OD_AXIS: 86

## 2023-12-11 ENCOUNTER — DOCTOR'S OFFICE (OUTPATIENT)
Dept: URBAN - NONMETROPOLITAN AREA CLINIC 1 | Facility: CLINIC | Age: 68
Setting detail: OPHTHALMOLOGY
End: 2023-12-11
Payer: COMMERCIAL

## 2023-12-11 DIAGNOSIS — H35.81: ICD-10-CM

## 2023-12-11 DIAGNOSIS — H16.223: ICD-10-CM

## 2023-12-11 DIAGNOSIS — H43.813: ICD-10-CM

## 2023-12-11 DIAGNOSIS — H34.8310: ICD-10-CM

## 2023-12-11 PROCEDURE — 92134 CPTRZ OPH DX IMG PST SGM RTA: CPT | Performed by: OPHTHALMOLOGY

## 2023-12-11 PROCEDURE — 99213 OFFICE O/P EST LOW 20 MIN: CPT | Performed by: OPHTHALMOLOGY

## 2023-12-11 ASSESSMENT — SPHEQUIV_DERIVED
OD_SPHEQUIV: -0.125
OS_SPHEQUIV: 0.375

## 2023-12-11 ASSESSMENT — CONFRONTATIONAL VISUAL FIELD TEST (CVF)
OS_FINDINGS: FULL
OD_FINDINGS: FULL

## 2023-12-11 ASSESSMENT — REFRACTION_AUTOREFRACTION
OS_CYLINDER: -1.25
OD_CYLINDER: -0.75
OD_SPHERE: +0.25
OS_AXIS: 60
OS_SPHERE: +1.00
OD_AXIS: 86

## 2023-12-11 ASSESSMENT — TEAR BREAK UP TIME (TBUT)
OD_TBUT: T
OS_TBUT: T

## 2023-12-11 ASSESSMENT — LID EXAM ASSESSMENTS
OS_BLEPHARITIS: T
OD_BLEPHARITIS: T

## 2023-12-11 ASSESSMENT — SUPERFICIAL PUNCTATE KERATITIS (SPK)
OS_SPK: T 1+
OD_SPK: T 1+

## 2024-02-05 ENCOUNTER — DOCTOR'S OFFICE (OUTPATIENT)
Dept: URBAN - NONMETROPOLITAN AREA CLINIC 1 | Facility: CLINIC | Age: 69
Setting detail: OPHTHALMOLOGY
End: 2024-02-05
Payer: COMMERCIAL

## 2024-02-05 DIAGNOSIS — H43.812: ICD-10-CM

## 2024-02-05 DIAGNOSIS — H34.8310: ICD-10-CM

## 2024-02-05 DIAGNOSIS — H43.811: ICD-10-CM

## 2024-02-05 DIAGNOSIS — H35.81: ICD-10-CM

## 2024-02-05 PROCEDURE — 92134 CPTRZ OPH DX IMG PST SGM RTA: CPT | Performed by: OPHTHALMOLOGY

## 2024-02-05 PROCEDURE — 99213 OFFICE O/P EST LOW 20 MIN: CPT | Performed by: OPHTHALMOLOGY

## 2024-02-05 ASSESSMENT — LID EXAM ASSESSMENTS
OD_BLEPHARITIS: T
OS_BLEPHARITIS: T

## 2024-02-05 ASSESSMENT — CONFRONTATIONAL VISUAL FIELD TEST (CVF)
OS_FINDINGS: FULL
OD_FINDINGS: FULL

## 2024-05-13 ENCOUNTER — DOCTOR'S OFFICE (OUTPATIENT)
Dept: URBAN - NONMETROPOLITAN AREA CLINIC 1 | Facility: CLINIC | Age: 69
Setting detail: OPHTHALMOLOGY
End: 2024-05-13
Payer: COMMERCIAL

## 2024-05-13 DIAGNOSIS — H16.223: ICD-10-CM

## 2024-05-13 DIAGNOSIS — H34.8310: ICD-10-CM

## 2024-05-13 DIAGNOSIS — H35.81: ICD-10-CM

## 2024-05-13 DIAGNOSIS — H43.813: ICD-10-CM

## 2024-05-13 PROCEDURE — 99214 OFFICE O/P EST MOD 30 MIN: CPT | Performed by: OPHTHALMOLOGY

## 2024-05-13 PROCEDURE — 92134 CPTRZ OPH DX IMG PST SGM RTA: CPT | Performed by: OPHTHALMOLOGY

## 2024-05-13 PROCEDURE — 92202 OPSCPY EXTND ON/MAC DRAW: CPT | Performed by: OPHTHALMOLOGY

## 2024-05-13 ASSESSMENT — CONFRONTATIONAL VISUAL FIELD TEST (CVF)
OS_FINDINGS: FULL
OD_FINDINGS: FULL

## 2024-05-13 ASSESSMENT — LID EXAM ASSESSMENTS
OD_BLEPHARITIS: T
OS_BLEPHARITIS: T

## 2024-08-05 ENCOUNTER — DOCTOR'S OFFICE (OUTPATIENT)
Dept: URBAN - NONMETROPOLITAN AREA CLINIC 1 | Facility: CLINIC | Age: 69
Setting detail: OPHTHALMOLOGY
End: 2024-08-05
Payer: COMMERCIAL

## 2024-08-05 DIAGNOSIS — H16.223: ICD-10-CM

## 2024-08-05 DIAGNOSIS — H34.8310: ICD-10-CM

## 2024-08-05 DIAGNOSIS — H43.813: ICD-10-CM

## 2024-08-05 DIAGNOSIS — H35.81: ICD-10-CM

## 2024-08-05 DIAGNOSIS — H16.222: ICD-10-CM

## 2024-08-05 DIAGNOSIS — H16.221: ICD-10-CM

## 2024-08-05 PROCEDURE — 83861 MICROFLUID ANALY TEARS: CPT | Mod: QW,RT | Performed by: OPHTHALMOLOGY

## 2024-08-05 PROCEDURE — 83861 MICROFLUID ANALY TEARS: CPT | Mod: QW,LT | Performed by: OPHTHALMOLOGY

## 2024-08-05 PROCEDURE — 99213 OFFICE O/P EST LOW 20 MIN: CPT | Performed by: OPHTHALMOLOGY

## 2024-08-05 PROCEDURE — 92134 CPTRZ OPH DX IMG PST SGM RTA: CPT | Performed by: OPHTHALMOLOGY

## 2024-08-05 ASSESSMENT — LID EXAM ASSESSMENTS
OS_BLEPHARITIS: T
OD_BLEPHARITIS: T

## 2024-08-05 ASSESSMENT — CONFRONTATIONAL VISUAL FIELD TEST (CVF)
OS_FINDINGS: FULL
OD_FINDINGS: FULL

## 2024-11-04 ENCOUNTER — OFFICE VISIT (OUTPATIENT)
Dept: URGENT CARE | Facility: CLINIC | Age: 69
End: 2024-11-04
Payer: COMMERCIAL

## 2024-11-04 VITALS
BODY MASS INDEX: 43.81 KG/M2 | TEMPERATURE: 98.8 F | OXYGEN SATURATION: 96 % | DIASTOLIC BLOOD PRESSURE: 88 MMHG | HEIGHT: 70 IN | SYSTOLIC BLOOD PRESSURE: 155 MMHG | RESPIRATION RATE: 20 BRPM | HEART RATE: 88 BPM | WEIGHT: 306 LBS

## 2024-11-04 DIAGNOSIS — M10.9 ACUTE GOUT OF RIGHT WRIST, UNSPECIFIED CAUSE: Primary | ICD-10-CM

## 2024-11-04 PROCEDURE — G0382 LEV 3 HOSP TYPE B ED VISIT: HCPCS

## 2024-11-04 RX ORDER — PREDNISONE 20 MG/1
40 TABLET ORAL DAILY
Qty: 10 TABLET | Refills: 0 | Status: SHIPPED | OUTPATIENT
Start: 2024-11-04 | End: 2024-11-09

## 2024-11-04 NOTE — PROGRESS NOTES
[facility]        NAME: Danilo Flores is a 68 y.o. male  : 1955    MRN: [id number]  DATE: 2024  TIME: 12:26 PM    Assessment and Plan   Acute gout of right wrist, unspecified cause [M10.9]  1. Acute gout of right wrist, unspecified cause  Ambulatory Referral to Family Practice    predniSONE 20 mg tablet            Patient Instructions     Take prednisone as prescribed - take in the morning with food  Do not take Mobic with other NSAIDs  Avoid high purine diet foods (seafood, meat, and alcohol-especially beer)    Follow up with PCP in 3-5 days.  Proceed to  ER if symptoms worsen.    If tests are performed, our office will contact you with results only if changes need to made to the care plan discussed with you at the visit. [facility]    Chief Complaint     Chief Complaint   Patient presents with    Wrist Pain     Right wrist pain and swelling started 2 days ago. Thinks it is another gout flare up.          History of Present Illness       68-year-old male arrives with a past medical history of gout reporting he believes it is flared up in right wrist over the past 2 days.  Patient reports he no longer has a primary care provider and has been off of his allopurinol for quite some time.  Patient reports his first episodes with gout flares were in his feet but the last time approximately 5 years ago was in his wrist.  Patient reports this feels similar.  Patient denies any injury to affected area.  Patient does have redness and swelling to right wrist area with decreased range of motion at present.  Patient has sensation completely intact in right upper extremity.        Review of Systems   Review of Systems   Constitutional: Negative.    HENT: Negative.     Respiratory: Negative.     Cardiovascular: Negative.    Gastrointestinal: Negative.    Musculoskeletal:  Positive for arthralgias (right wrist) and joint swelling (right wrist).  "  Skin:  Positive for color change (redness to right wrist).         Current Medications       Current Outpatient Medications:     brimonidine tartrate 0.2 % ophthalmic solution, , Disp: , Rfl:     predniSONE 20 mg tablet, Take 2 tablets (40 mg total) by mouth daily for 5 days, Disp: 10 tablet, Rfl: 0    allopurinol (ZYLOPRIM) 300 mg tablet, Take 1 tablet (300 mg total) by mouth daily (Patient not taking: Reported on 11/4/2024), Disp: 90 tablet, Rfl: 3    Current Allergies     Allergies as of 11/04/2024    (No Known Allergies)            The following portions of the patient's history were reviewed and updated as appropriate: allergies, current medications, past family history, past medical history, past social history, past surgical history and problem list.     Past Medical History:   Diagnosis Date    Gout     Psoriasis     Sleep apnea        History reviewed. No pertinent surgical history.    History reviewed. No pertinent family history.      Medications have been verified.        Objective   /88   Pulse 88   Temp 98.8 °F (37.1 °C)   Resp 20   Ht 5' 9.5\" (1.765 m)   Wt (!) 139 kg (306 lb)   SpO2 96%   BMI 44.54 kg/m²        Physical Exam     Physical Exam  Vitals and nursing note reviewed.   Constitutional:       General: He is not in acute distress.     Appearance: Normal appearance. He is not ill-appearing.   HENT:      Head: Normocephalic.      Right Ear: External ear normal.      Left Ear: External ear normal.      Nose: Nose normal. No congestion.      Mouth/Throat:      Mouth: Mucous membranes are moist.      Pharynx: No oropharyngeal exudate or posterior oropharyngeal erythema.   Eyes:      Extraocular Movements: Extraocular movements intact.      Conjunctiva/sclera: Conjunctivae normal.      Pupils: Pupils are equal, round, and reactive to light.   Cardiovascular:      Rate and Rhythm: Normal rate and regular rhythm.      Pulses: Normal pulses.      Heart sounds: Normal heart sounds. "   Pulmonary:      Effort: Pulmonary effort is normal. No respiratory distress.      Breath sounds: Normal breath sounds. No stridor. No wheezing, rhonchi or rales.   Chest:      Chest wall: No tenderness.   Musculoskeletal:         General: Swelling and tenderness present. No signs of injury.      Right wrist: Swelling, tenderness and bony tenderness present. No deformity, effusion or lacerations. Decreased range of motion. Normal pulse.      Cervical back: Normal range of motion and neck supple.   Lymphadenopathy:      Cervical: No cervical adenopathy.   Skin:     General: Skin is warm and dry.   Neurological:      General: No focal deficit present.      Mental Status: He is alert and oriented to person, place, and time.   Psychiatric:         Mood and Affect: Mood normal.         Behavior: Behavior normal.

## 2024-11-04 NOTE — PATIENT INSTRUCTIONS
"  Take prednisone as prescribed - take in the morning with food  Do not take Mobic with other NSAIDs  Avoid high purine diet foods (seafood, meat, and alcohol-especially beer)    Follow up with PCP in 3-5 days.  Proceed to  ER if symptoms worsen.    If tests are performed, our office will contact you with results only if changes need to made to the care plan discussed with you at the visit. You can review your full results on [facility].    Patient Education     Gout   The Basics   Written by the doctors and editors at [facility]   What is gout? -- Gout is a form of arthritis. It can cause pain and swelling in the joints.  Gout happens in people who have too much uric acid in their blood. Uric acid is a chemical that is produced when the body breaks down certain foods. Uric acid can form sharp needle-like crystals that build up in the joints and cause pain. These crystals can also form inside the tubes that carry urine from the kidneys to the bladder. There, they can turn into kidney stones that can cause pain and problems with the flow of urine.  People with gout get sudden \"flares\" or attacks of severe pain. But there are medicines that can help prevent this.  What are the symptoms of gout? -- Gout flares most often cause pain in the big toe, ankle, or knee. Often, the joint also turns red and swells. Usually, only 1 joint is affected, but some people have pain in more than 1 joint. Gout flares tend to happen more often during the night.  The pain from gout can be extreme. The pain and swelling are worst at the beginning of a flare. The symptoms then get better within a few days to weeks. It is not clear how the body \"turns off\" a gout flare.  Is there a test for gout? -- Yes. To test for gout, your doctor or nurse can take a sample of fluid from your joint that is in pain. If they find typical gout crystals in the fluid, then you have gout.  Even without checking fluid from a joint, the doctor or nurse " might still strongly suspect gout if:   You have had pain and swelling in 1 joint, especially the joint at the base of the big toe.   Your symptoms completely go away between flares, at least when you first start having them.   Your blood tests show high levels of uric acid.  How is gout treated? -- There are a few medicines that can reduce the pain and swelling caused by gout. When you find one that works for you, have it close by all of the time. That way, you can take it as soon you feel a flare starting. Gout medicines work best if you take them as soon as symptoms start.  The medicines used to treat gout flares include:   NSAIDs - This is a large group of medicines that includes ibuprofen (sample brand names: Advil, Motrin) and indomethacin (brand name: Indocin). NSAIDs might not be safe for people with kidney or liver disease, or for people who have bleeding problems.   Colchicine - This medicine helps with gout but it can also cause diarrhea, nausea, vomiting, and stomach pain.   Steroids - Steroid medicines can reduce swelling and pain. Steroids can be taken as pills or as shots.  Are there medicines to prevent gout flares? -- Yes, there are medicines that can reduce the chances of having future gout flares. Most people who have repeated or severe flares of gout need to take these medicines. In general, they all work by reducing the amount of uric acid in the blood.  Examples of these medicines include:   Allopurinol (brand names: Aloprim, Zyloprim)   Febuxostat (brand name: Uloric)   Probenecid  People with severe gout can also get a medicine called pegloticase (brand name: Krystexxa), which is given through a vein. This medicine can cause an allergic reaction in some people.  If you take a medicine to prevent gout, your doctor or nurse will want to make sure that you use it safely. They might also want to check that your uric acid level gets low enough to dissolve the gout crystals. Allopurinol,  "febuxostat, and probenecid can actually increase gout flares when you first start taking them. To prevent these flares, your doctor or nurse might suggest that you take low doses of colchicine when you start the medicines. This will give the gout crystals time to dissolve, and that will stop the flares over time. If you do have a gout flare, it's important to keep taking your daily medicines normally.  Your doctor will order blood tests to check your uric acid levels regularly. This is to make sure that the medicines are working and that you are taking the right dose. Keeping your uric acid level below a certain level can help prevent gout flares.  Can I do anything on my own to prevent gout flares? -- Yes. There are some things that might help:   It's not clear that following a specific diet plan will help with gout symptoms. But eating a balanced diet can help improve your overall health. Try to eat plenty of fruits, vegetables, whole grains, and low-fat dairy products. It's also important to drink plenty of water, and try not to get dehydrated.   Limit sugary drinks and alcohol. These can make gout flares worse.   Some people feel better when they limit foods that are high in \"purines.\" (Purines are natural substances found in many foods.) You can ask your doctor if avoiding high-purine foods might help you. But other things, like taking your medicines and avoiding alcohol, are more likely to help with gout symptoms.   If you have excess body weight, losing weight can help relieve gout.   Some people with gout also have other health problems, such as heart disease, high blood pressure, kidney disease, or obesity. If you have any of these issues, work with your doctor to manage them. This can help improve your overall health and might also help with your gout.   When you have a gout flare, you might feel better if you also rest or ice your joint.  If you are having trouble managing your gout, your doctor might " "refer you to a specialist called a \"rheumatologist.\"  All topics are updated as new evidence becomes available and our peer review process is complete.  This topic retrieved from [employer] on: Mar 30, 2024.  Topic 35799 Version 22.0  Release: 32.2.4 - C32.88  © 2024 UpToDate, Inc. and/or its affiliates. All rights reserved.  Consumer Information Use and Disclaimer   Disclaimer: This generalized information is a limited summary of diagnosis, treatment, and/or medication information. It is not meant to be comprehensive and should be used as a tool to help the user understand and/or assess potential diagnostic and treatment options. It does NOT include all information about conditions, treatments, medications, side effects, or risks that may apply to a specific patient. It is not intended to be medical advice or a substitute for the medical advice, diagnosis, or treatment of a health care provider based on the health care provider's examination and assessment of a patient's specific and unique circumstances. Patients must speak with a health care provider for complete information about their health, medical questions, and treatment options, including any risks or benefits regarding use of medications. This information does not endorse any treatments or medications as safe, effective, or approved for treating a specific patient. UpToDate, Inc. and its affiliates disclaim any warranty or liability relating to this information or the use thereof.The use of this information is governed by the Terms of Use, available at https://www.woltersPlayBuzzuwer.com/en/know/clinical-effectiveness-terms. 2024© UpToDate, Inc. and its affiliates and/or licensors. All rights reserved.  Copyright   © 2024 UpToDate, Inc. and/or its affiliates. All rights reserved.    "

## 2024-11-13 ENCOUNTER — OFFICE VISIT (OUTPATIENT)
Dept: FAMILY MEDICINE CLINIC | Facility: CLINIC | Age: 69
End: 2024-11-13
Payer: COMMERCIAL

## 2024-11-13 VITALS
HEART RATE: 76 BPM | DIASTOLIC BLOOD PRESSURE: 72 MMHG | SYSTOLIC BLOOD PRESSURE: 124 MMHG | OXYGEN SATURATION: 98 % | WEIGHT: 310.4 LBS | BODY MASS INDEX: 44.44 KG/M2 | HEIGHT: 70 IN

## 2024-11-13 DIAGNOSIS — Z12.5 PROSTATE CANCER SCREENING: ICD-10-CM

## 2024-11-13 DIAGNOSIS — Z00.00 ANNUAL PHYSICAL EXAM: Primary | ICD-10-CM

## 2024-11-13 DIAGNOSIS — Z13.220 LIPID SCREENING: ICD-10-CM

## 2024-11-13 DIAGNOSIS — M1A.09X0 IDIOPATHIC CHRONIC GOUT OF MULTIPLE SITES WITHOUT TOPHUS: ICD-10-CM

## 2024-11-13 DIAGNOSIS — M10.9 ACUTE GOUT OF RIGHT WRIST, UNSPECIFIED CAUSE: ICD-10-CM

## 2024-11-13 PROCEDURE — 99213 OFFICE O/P EST LOW 20 MIN: CPT | Performed by: FAMILY MEDICINE

## 2024-11-13 PROCEDURE — 99397 PER PM REEVAL EST PAT 65+ YR: CPT | Performed by: FAMILY MEDICINE

## 2024-11-13 NOTE — PROGRESS NOTES
"Assessment/Plan:       1. Annual physical exam  Assessment & Plan:  Reviewed age-appropriate health maintenance and preventive care.    Orders:  -     CBC and Platelet; Future  -     Comprehensive metabolic panel; Future  -     Lipid panel; Future  -     LDL cholesterol, direct; Future  -     PSA, Total Screen; Future  -     Uric acid; Future  2. Idiopathic chronic gout of multiple sites without tophus  Comments:  check uric acid  will then start allopurinol with colcrys overlap  Orders:  -     Uric acid; Future  3. Acute gout of right wrist, unspecified cause  Comments:  as above  Orders:  -     Ambulatory Referral to Family Practice  4. Prostate cancer screening  Comments:  check labs  Orders:  -     PSA, Total Screen; Future  5. Lipid screening  Comments:  check labs  Orders:  -     Lipid panel; Future  -     LDL cholesterol, direct; Future        Subjective:      Patient ID: Danilo Flores is a 68 y.o. male.    Danilo is here for his initial visit.  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  He ended up at urgent care recently with a gout attack of the right wrist he has had that before plus in his foot.  He used to be on uric acid but he has been without a doctor and has not filled it.  He responded well to prednisone on this occasion.  He does not smoke or drink.  He understands importance of healthy diet and exercise and weight loss.        The following portions of the patient's history were reviewed and updated as appropriate: allergies, current medications, past family history, past medical history, past social history, past surgical history, and problem list.    Review of Systems   Respiratory: Negative.     Cardiovascular: Negative.    Gastrointestinal: Negative.          Objective:      /72 (BP Location: Left arm, Patient Position: Sitting, Cuff Size: Large)   Pulse 76   Ht 5' 9.5\" (1.765 m)   Wt (!) 141 kg (310 lb 6.4 oz)   SpO2 98%   BMI 45.18 kg/m² "          Physical Exam  Vitals and nursing note reviewed.   Constitutional:       Appearance: Normal appearance.   HENT:      Head: Normocephalic and atraumatic.      Nose: Nose normal.      Mouth/Throat:      Mouth: Mucous membranes are moist.   Eyes:      Extraocular Movements: Extraocular movements intact.      Pupils: Pupils are equal, round, and reactive to light.   Cardiovascular:      Rate and Rhythm: Normal rate and regular rhythm.      Pulses: Normal pulses.   Pulmonary:      Effort: Pulmonary effort is normal.      Breath sounds: Normal breath sounds.   Abdominal:      General: Bowel sounds are normal.      Palpations: Abdomen is soft.   Musculoskeletal:      Cervical back: Normal range of motion.   Skin:     General: Skin is warm and dry.      Capillary Refill: Capillary refill takes less than 2 seconds.   Neurological:      General: No focal deficit present.      Mental Status: He is alert.   Psychiatric:         Mood and Affect: Mood normal.

## 2024-11-14 ENCOUNTER — DOCTOR'S OFFICE (OUTPATIENT)
Dept: URBAN - NONMETROPOLITAN AREA CLINIC 1 | Facility: CLINIC | Age: 69
Setting detail: OPHTHALMOLOGY
End: 2024-11-14
Payer: COMMERCIAL

## 2024-11-14 ENCOUNTER — OPTICAL OFFICE (OUTPATIENT)
Dept: URBAN - NONMETROPOLITAN AREA CLINIC 4 | Facility: CLINIC | Age: 69
Setting detail: OPHTHALMOLOGY
End: 2024-11-14
Payer: COMMERCIAL

## 2024-11-14 ENCOUNTER — LAB (OUTPATIENT)
Dept: LAB | Facility: CLINIC | Age: 69
End: 2024-11-14
Payer: COMMERCIAL

## 2024-11-14 DIAGNOSIS — Z12.5 PROSTATE CANCER SCREENING: ICD-10-CM

## 2024-11-14 DIAGNOSIS — Z13.220 LIPID SCREENING: ICD-10-CM

## 2024-11-14 DIAGNOSIS — H52.223: ICD-10-CM

## 2024-11-14 DIAGNOSIS — M1A.09X0 IDIOPATHIC CHRONIC GOUT OF MULTIPLE SITES WITHOUT TOPHUS: ICD-10-CM

## 2024-11-14 DIAGNOSIS — H52.4: ICD-10-CM

## 2024-11-14 DIAGNOSIS — Z00.00 ANNUAL PHYSICAL EXAM: ICD-10-CM

## 2024-11-14 LAB
ALBUMIN SERPL BCG-MCNC: 3.9 G/DL (ref 3.5–5)
ALP SERPL-CCNC: 97 U/L (ref 34–104)
ALT SERPL W P-5'-P-CCNC: 30 U/L (ref 7–52)
ANION GAP SERPL CALCULATED.3IONS-SCNC: 5 MMOL/L (ref 4–13)
AST SERPL W P-5'-P-CCNC: 19 U/L (ref 13–39)
BILIRUB SERPL-MCNC: 0.49 MG/DL (ref 0.2–1)
BUN SERPL-MCNC: 14 MG/DL (ref 5–25)
CALCIUM SERPL-MCNC: 8.6 MG/DL (ref 8.4–10.2)
CHLORIDE SERPL-SCNC: 103 MMOL/L (ref 96–108)
CHOLEST SERPL-MCNC: 197 MG/DL (ref ?–200)
CO2 SERPL-SCNC: 31 MMOL/L (ref 21–32)
CREAT SERPL-MCNC: 1.28 MG/DL (ref 0.6–1.3)
ERYTHROCYTE [DISTWIDTH] IN BLOOD BY AUTOMATED COUNT: 12.3 % (ref 11.6–15.1)
GFR SERPL CREATININE-BSD FRML MDRD: 57 ML/MIN/1.73SQ M
GLUCOSE P FAST SERPL-MCNC: 96 MG/DL (ref 65–99)
HCT VFR BLD AUTO: 39.8 % (ref 36.5–49.3)
HDLC SERPL-MCNC: 41 MG/DL
HGB BLD-MCNC: 12.9 G/DL (ref 12–17)
LDLC SERPL CALC-MCNC: 92 MG/DL (ref 0–100)
LDLC SERPL DIRECT ASSAY-MCNC: 123 MG/DL (ref 0–100)
MCH RBC QN AUTO: 31.4 PG (ref 26.8–34.3)
MCHC RBC AUTO-ENTMCNC: 32.4 G/DL (ref 31.4–37.4)
MCV RBC AUTO: 97 FL (ref 82–98)
NONHDLC SERPL-MCNC: 156 MG/DL
PLATELET # BLD AUTO: 264 THOUSANDS/UL (ref 149–390)
PMV BLD AUTO: 9.1 FL (ref 8.9–12.7)
POTASSIUM SERPL-SCNC: 4 MMOL/L (ref 3.5–5.3)
PROT SERPL-MCNC: 6.9 G/DL (ref 6.4–8.4)
PSA SERPL-MCNC: 0.65 NG/ML (ref 0–4)
RBC # BLD AUTO: 4.11 MILLION/UL (ref 3.88–5.62)
SODIUM SERPL-SCNC: 139 MMOL/L (ref 135–147)
TRIGL SERPL-MCNC: 320 MG/DL (ref ?–150)
URATE SERPL-MCNC: 8.1 MG/DL (ref 3.5–8.5)
WBC # BLD AUTO: 7.58 THOUSAND/UL (ref 4.31–10.16)

## 2024-11-14 PROCEDURE — V2020 VISION SVCS FRAMES PURCHASES: HCPCS

## 2024-11-14 PROCEDURE — V2750 ANTI-REFLECTIVE COATING: HCPCS | Mod: LT

## 2024-11-14 PROCEDURE — 92012 INTRM OPH EXAM EST PATIENT: CPT

## 2024-11-14 PROCEDURE — 92015 DETERMINE REFRACTIVE STATE: CPT

## 2024-11-14 PROCEDURE — V2781 PROGRESSIVE LENS PER LENS: HCPCS

## 2024-11-14 PROCEDURE — 83721 ASSAY OF BLOOD LIPOPROTEIN: CPT

## 2024-11-14 PROCEDURE — 85027 COMPLETE CBC AUTOMATED: CPT

## 2024-11-14 PROCEDURE — 36415 COLL VENOUS BLD VENIPUNCTURE: CPT

## 2024-11-14 PROCEDURE — 84550 ASSAY OF BLOOD/URIC ACID: CPT

## 2024-11-14 PROCEDURE — 80061 LIPID PANEL: CPT

## 2024-11-14 PROCEDURE — V2781 PROGRESSIVE LENS PER LENS: HCPCS | Mod: LT

## 2024-11-14 PROCEDURE — G0103 PSA SCREENING: HCPCS

## 2024-11-14 PROCEDURE — V2750 ANTI-REFLECTIVE COATING: HCPCS

## 2024-11-14 PROCEDURE — 80053 COMPREHEN METABOLIC PANEL: CPT

## 2024-11-14 ASSESSMENT — REFRACTION_MANIFEST
OU_VA: 20/20
OS_VA1: 20/20
OS_ADD: +2.75
OS_SPHERE: +0.75
OD_AXIS: 100
OS_CYLINDER: -1.25
OD_VA1: 20/20
OD_SPHERE: +0.25
OD_VA2: 20/20
OS_VA2: 20/20
OD_CYLINDER: -1.25
OD_ADD: +2.75
OS_AXIS: 080

## 2024-11-14 ASSESSMENT — SUPERFICIAL PUNCTATE KERATITIS (SPK)
OD_SPK: T 1+
OS_SPK: T 1+

## 2024-11-14 ASSESSMENT — REFRACTION_CURRENTRX
OD_SPHERE: PLANO
OS_SPHERE: +0.75
OD_OVR_VA: 20/
OD_CYLINDER: -1.00
OS_CYLINDER: -1.25
OS_OVR_VA: 20/
OD_VPRISM_DIRECTION: PROGS
OD_AXIS: 104
OS_ADD: +1.75
OS_AXIS: 062
OD_ADD: +1.75
OS_VPRISM_DIRECTION: PROGS

## 2024-11-14 ASSESSMENT — CONFRONTATIONAL VISUAL FIELD TEST (CVF)
OD_FINDINGS: FULL
OS_FINDINGS: FULL

## 2024-11-14 ASSESSMENT — VISUAL ACUITY
OD_BCVA: 20/25
OS_BCVA: 20/25

## 2024-11-14 ASSESSMENT — REFRACTION_AUTOREFRACTION
OD_CYLINDER: -1.00
OD_AXIS: 105
OD_SPHERE: +0.25
OS_CYLINDER: -0.75
OS_AXIS: 074
OS_SPHERE: +0.50

## 2024-11-14 ASSESSMENT — TEAR BREAK UP TIME (TBUT)
OS_TBUT: T
OD_TBUT: T

## 2024-11-14 ASSESSMENT — LID EXAM ASSESSMENTS
OS_BLEPHARITIS: T
OD_BLEPHARITIS: T

## 2024-11-15 ENCOUNTER — RESULTS FOLLOW-UP (OUTPATIENT)
Dept: FAMILY MEDICINE CLINIC | Facility: CLINIC | Age: 69
End: 2024-11-15

## 2024-11-15 NOTE — TELEPHONE ENCOUNTER
Pt called stating that one of the reasons he was sent for labs was because of his gout flare - pt asking if Dr. Budinetz is considering prescribing Allopurinol.    Please advise - [phone] - ok to M if no answer.

## 2024-11-15 NOTE — TELEPHONE ENCOUNTER
----- Message from Robert Budinetz, MD sent at 11/15/2024  2:00 AM EST -----  Continue to focus on diet and weight loss  Triglycerides slightly elevated

## 2024-11-20 DIAGNOSIS — M10.9 GOUT, UNSPECIFIED CAUSE, UNSPECIFIED CHRONICITY, UNSPECIFIED SITE: Primary | ICD-10-CM

## 2024-11-20 RX ORDER — ALLOPURINOL 100 MG/1
100 TABLET ORAL DAILY
Qty: 30 TABLET | Refills: 5 | Status: SHIPPED | OUTPATIENT
Start: 2024-11-20

## 2024-11-20 RX ORDER — COLCHICINE 0.6 MG/1
0.6 TABLET ORAL DAILY
Qty: 30 TABLET | Refills: 0 | Status: SHIPPED | OUTPATIENT
Start: 2024-11-20 | End: 2024-11-27

## 2024-11-25 ENCOUNTER — DOCTOR'S OFFICE (OUTPATIENT)
Dept: URBAN - NONMETROPOLITAN AREA CLINIC 1 | Facility: CLINIC | Age: 69
Setting detail: OPHTHALMOLOGY
End: 2024-11-25
Payer: COMMERCIAL

## 2024-11-25 DIAGNOSIS — H16.222: ICD-10-CM

## 2024-11-25 DIAGNOSIS — H43.813: ICD-10-CM

## 2024-11-25 DIAGNOSIS — H25.13: ICD-10-CM

## 2024-11-25 DIAGNOSIS — H35.81: ICD-10-CM

## 2024-11-25 DIAGNOSIS — H34.8310: ICD-10-CM

## 2024-11-25 DIAGNOSIS — H16.221: ICD-10-CM

## 2024-11-25 PROBLEM — H43.393 VITREOUS FLOATERS; BOTH EYES: Status: ACTIVE | Noted: 2024-11-25

## 2024-11-25 PROBLEM — H52.4 PRESBYOPIA: Status: ACTIVE | Noted: 2024-11-14

## 2024-11-25 PROCEDURE — 99214 OFFICE O/P EST MOD 30 MIN: CPT | Performed by: OPHTHALMOLOGY

## 2024-11-25 PROCEDURE — 92202 OPSCPY EXTND ON/MAC DRAW: CPT | Performed by: OPHTHALMOLOGY

## 2024-11-25 PROCEDURE — 83861 MICROFLUID ANALY TEARS: CPT | Mod: QW,RT | Performed by: OPHTHALMOLOGY

## 2024-11-25 PROCEDURE — 83861 MICROFLUID ANALY TEARS: CPT | Mod: QW,LT | Performed by: OPHTHALMOLOGY

## 2024-11-25 PROCEDURE — 92134 CPTRZ OPH DX IMG PST SGM RTA: CPT | Performed by: OPHTHALMOLOGY

## 2024-11-25 ASSESSMENT — REFRACTION_CURRENTRX
OS_SPHERE: +0.75
OD_ADD: +1.75
OS_AXIS: 062
OS_CYLINDER: -1.25
OS_OVR_VA: 20/
OD_VPRISM_DIRECTION: PROGS
OD_OVR_VA: 20/
OS_ADD: +1.75
OS_VPRISM_DIRECTION: PROGS
OD_CYLINDER: -1.00
OD_AXIS: 104
OD_SPHERE: PLANO

## 2024-11-25 ASSESSMENT — REFRACTION_AUTOREFRACTION
OS_SPHERE: +0.50
OD_SPHERE: +0.25
OD_CYLINDER: -1.00
OD_AXIS: 105
OS_CYLINDER: -0.75
OS_AXIS: 074

## 2024-11-25 ASSESSMENT — REFRACTION_MANIFEST
OS_ADD: +2.75
OD_AXIS: 100
OS_SPHERE: +0.75
OS_AXIS: 080
OD_CYLINDER: -1.25
OD_VA2: 20/20
OS_VA1: 20/20
OD_SPHERE: +0.25
OS_CYLINDER: -1.25
OS_VA2: 20/20
OD_ADD: +2.75
OD_VA1: 20/20
OU_VA: 20/20

## 2024-11-25 ASSESSMENT — VISUAL ACUITY
OD_BCVA: 20/20-1
OS_BCVA: 20/20-1

## 2024-11-25 ASSESSMENT — LID EXAM ASSESSMENTS
OS_BLEPHARITIS: T
OD_BLEPHARITIS: T

## 2024-11-25 ASSESSMENT — SUPERFICIAL PUNCTATE KERATITIS (SPK)
OS_SPK: T 1+
OD_SPK: T 1+

## 2024-11-25 ASSESSMENT — TEAR BREAK UP TIME (TBUT)
OD_TBUT: T
OS_TBUT: T

## 2024-12-09 ENCOUNTER — DOCTOR'S OFFICE (OUTPATIENT)
Dept: URBAN - NONMETROPOLITAN AREA CLINIC 1 | Facility: CLINIC | Age: 69
Setting detail: OPHTHALMOLOGY
End: 2024-12-09
Payer: COMMERCIAL

## 2024-12-09 DIAGNOSIS — H35.81: ICD-10-CM

## 2024-12-09 DIAGNOSIS — H34.8310: ICD-10-CM

## 2024-12-09 PROCEDURE — 92250 FUNDUS PHOTOGRAPHY W/I&R: CPT | Performed by: OPHTHALMOLOGY

## 2024-12-09 PROCEDURE — 99213 OFFICE O/P EST LOW 20 MIN: CPT | Performed by: OPHTHALMOLOGY

## 2024-12-09 PROCEDURE — 92235 FLUORESCEIN ANGRPH MLTIFRAME: CPT | Performed by: OPHTHALMOLOGY

## 2024-12-09 ASSESSMENT — REFRACTION_CURRENTRX
OD_VPRISM_DIRECTION: PROGS
OS_AXIS: 062
OD_ADD: +1.75
OD_OVR_VA: 20/
OS_SPHERE: +0.75
OD_CYLINDER: -1.00
OS_VPRISM_DIRECTION: PROGS
OD_SPHERE: PLANO
OS_CYLINDER: -1.25
OD_AXIS: 104
OS_OVR_VA: 20/
OS_ADD: +1.75

## 2024-12-09 ASSESSMENT — REFRACTION_AUTOREFRACTION
OS_AXIS: 074
OS_CYLINDER: -0.75
OD_CYLINDER: -1.00
OD_SPHERE: +0.25
OD_AXIS: 105
OS_SPHERE: +0.50

## 2024-12-09 ASSESSMENT — REFRACTION_MANIFEST
OD_ADD: +2.75
OD_AXIS: 100
OD_SPHERE: +0.25
OS_VA1: 20/20
OU_VA: 20/20
OS_VA2: 20/20
OD_VA1: 20/20
OS_SPHERE: +0.75
OS_CYLINDER: -1.25
OS_AXIS: 080
OS_ADD: +2.75
OD_CYLINDER: -1.25
OD_VA2: 20/20

## 2024-12-09 ASSESSMENT — CONFRONTATIONAL VISUAL FIELD TEST (CVF)
OD_FINDINGS: FULL
OS_FINDINGS: FULL

## 2024-12-09 ASSESSMENT — TEAR BREAK UP TIME (TBUT)
OS_TBUT: T
OD_TBUT: T

## 2024-12-09 ASSESSMENT — SUPERFICIAL PUNCTATE KERATITIS (SPK)
OS_SPK: T 1+
OD_SPK: T 1+

## 2024-12-09 ASSESSMENT — VISUAL ACUITY
OS_BCVA: 20/20
OD_BCVA: 20/20

## 2024-12-09 ASSESSMENT — LID EXAM ASSESSMENTS
OS_BLEPHARITIS: T
OD_BLEPHARITIS: T

## 2024-12-16 ENCOUNTER — DOCTOR'S OFFICE (OUTPATIENT)
Dept: URBAN - NONMETROPOLITAN AREA CLINIC 1 | Facility: CLINIC | Age: 69
Setting detail: OPHTHALMOLOGY
End: 2024-12-16
Payer: COMMERCIAL

## 2024-12-16 DIAGNOSIS — H34.8310: ICD-10-CM

## 2024-12-16 PROCEDURE — 67028 INJECTION EYE DRUG: CPT | Mod: RT | Performed by: OPHTHALMOLOGY

## 2024-12-16 ASSESSMENT — REFRACTION_AUTOREFRACTION
OD_SPHERE: +0.25
OS_CYLINDER: -0.75
OS_SPHERE: +0.50
OD_AXIS: 105
OD_CYLINDER: -1.00
OS_AXIS: 074

## 2024-12-16 ASSESSMENT — VISUAL ACUITY
OS_BCVA: 20/20
OD_BCVA: 20/20

## 2024-12-17 DIAGNOSIS — M10.9 GOUT, UNSPECIFIED CAUSE, UNSPECIFIED CHRONICITY, UNSPECIFIED SITE: ICD-10-CM

## 2024-12-18 RX ORDER — ALLOPURINOL 100 MG/1
100 TABLET ORAL DAILY
Qty: 90 TABLET | Refills: 1 | Status: SHIPPED | OUTPATIENT
Start: 2024-12-18

## 2025-01-06 ENCOUNTER — DOCTOR'S OFFICE (OUTPATIENT)
Dept: URBAN - NONMETROPOLITAN AREA CLINIC 1 | Facility: CLINIC | Age: 70
Setting detail: OPHTHALMOLOGY
End: 2025-01-06
Payer: COMMERCIAL

## 2025-01-06 DIAGNOSIS — H34.8310: ICD-10-CM

## 2025-01-06 PROCEDURE — 92134 CPTRZ OPH DX IMG PST SGM RTA: CPT | Performed by: OPHTHALMOLOGY

## 2025-01-06 PROCEDURE — 99213 OFFICE O/P EST LOW 20 MIN: CPT | Performed by: OPHTHALMOLOGY

## 2025-01-06 ASSESSMENT — REFRACTION_AUTOREFRACTION
OD_SPHERE: +0.25
OS_AXIS: 074
OS_SPHERE: +0.50
OD_AXIS: 105
OD_CYLINDER: -1.00
OS_CYLINDER: -0.75

## 2025-01-06 ASSESSMENT — TEAR BREAK UP TIME (TBUT)
OD_TBUT: T
OS_TBUT: T

## 2025-01-06 ASSESSMENT — CONFRONTATIONAL VISUAL FIELD TEST (CVF)
OS_FINDINGS: FULL
OD_FINDINGS: FULL

## 2025-01-06 ASSESSMENT — SUPERFICIAL PUNCTATE KERATITIS (SPK)
OD_SPK: T 1+
OS_SPK: T 1+

## 2025-01-06 ASSESSMENT — VISUAL ACUITY
OD_BCVA: 20/30+2
OS_BCVA: 20/30

## 2025-01-06 ASSESSMENT — LID EXAM ASSESSMENTS
OS_BLEPHARITIS: T
OD_BLEPHARITIS: T

## 2025-01-20 ENCOUNTER — DOCTOR'S OFFICE (OUTPATIENT)
Dept: URBAN - NONMETROPOLITAN AREA CLINIC 1 | Facility: CLINIC | Age: 70
Setting detail: OPHTHALMOLOGY
End: 2025-01-20
Payer: COMMERCIAL

## 2025-01-20 DIAGNOSIS — H34.8310: ICD-10-CM

## 2025-01-20 PROCEDURE — 67028 INJECTION EYE DRUG: CPT | Mod: RT | Performed by: OPHTHALMOLOGY

## 2025-01-20 ASSESSMENT — VISUAL ACUITY
OS_BCVA: 20/20
OD_BCVA: 20/30+2

## 2025-01-20 ASSESSMENT — REFRACTION_AUTOREFRACTION
OD_SPHERE: +0.25
OS_AXIS: 074
OD_AXIS: 105
OD_CYLINDER: -1.00
OS_CYLINDER: -0.75
OS_SPHERE: +0.50

## 2025-02-03 ENCOUNTER — DOCTOR'S OFFICE (OUTPATIENT)
Dept: URBAN - NONMETROPOLITAN AREA CLINIC 1 | Facility: CLINIC | Age: 70
Setting detail: OPHTHALMOLOGY
End: 2025-02-03
Payer: COMMERCIAL

## 2025-02-03 DIAGNOSIS — H34.8310: ICD-10-CM

## 2025-02-03 PROCEDURE — 92134 CPTRZ OPH DX IMG PST SGM RTA: CPT | Performed by: OPHTHALMOLOGY

## 2025-02-03 PROCEDURE — 99213 OFFICE O/P EST LOW 20 MIN: CPT | Performed by: OPHTHALMOLOGY

## 2025-02-03 ASSESSMENT — CONFRONTATIONAL VISUAL FIELD TEST (CVF)
OS_FINDINGS: FULL
OD_FINDINGS: FULL

## 2025-02-03 ASSESSMENT — TEAR BREAK UP TIME (TBUT)
OD_TBUT: T
OS_TBUT: T

## 2025-02-03 ASSESSMENT — LID EXAM ASSESSMENTS
OS_BLEPHARITIS: T
OD_BLEPHARITIS: T

## 2025-02-03 ASSESSMENT — REFRACTION_AUTOREFRACTION
OS_CYLINDER: -0.75
OD_AXIS: 105
OD_SPHERE: +0.25
OD_CYLINDER: -1.00
OS_AXIS: 074
OS_SPHERE: +0.50

## 2025-02-03 ASSESSMENT — VISUAL ACUITY
OS_BCVA: 20/20-1
OD_BCVA: 20/20-2

## 2025-02-03 ASSESSMENT — SUPERFICIAL PUNCTATE KERATITIS (SPK)
OD_SPK: T 1+
OS_SPK: T 1+

## 2025-03-03 ENCOUNTER — DOCTOR'S OFFICE (OUTPATIENT)
Dept: URBAN - NONMETROPOLITAN AREA CLINIC 1 | Facility: CLINIC | Age: 70
Setting detail: OPHTHALMOLOGY
End: 2025-03-03
Payer: COMMERCIAL

## 2025-03-03 DIAGNOSIS — H35.81: ICD-10-CM

## 2025-03-03 DIAGNOSIS — H43.813: ICD-10-CM

## 2025-03-03 DIAGNOSIS — H34.8310: ICD-10-CM

## 2025-03-03 DIAGNOSIS — H43.393: ICD-10-CM

## 2025-03-03 PROCEDURE — 92134 CPTRZ OPH DX IMG PST SGM RTA: CPT | Performed by: OPHTHALMOLOGY

## 2025-03-03 PROCEDURE — 99213 OFFICE O/P EST LOW 20 MIN: CPT | Performed by: OPHTHALMOLOGY

## 2025-03-03 ASSESSMENT — REFRACTION_AUTOREFRACTION
OS_AXIS: 074
OS_SPHERE: +0.50
OS_CYLINDER: -0.75
OD_AXIS: 105
OD_CYLINDER: -1.00
OD_SPHERE: +0.25

## 2025-03-03 ASSESSMENT — CONFRONTATIONAL VISUAL FIELD TEST (CVF)
OD_FINDINGS: FULL
OS_FINDINGS: FULL

## 2025-03-03 ASSESSMENT — SUPERFICIAL PUNCTATE KERATITIS (SPK)
OS_SPK: T 1+
OD_SPK: T 1+

## 2025-03-03 ASSESSMENT — TEAR BREAK UP TIME (TBUT)
OS_TBUT: T
OD_TBUT: T

## 2025-03-03 ASSESSMENT — LID EXAM ASSESSMENTS
OD_BLEPHARITIS: T
OS_BLEPHARITIS: T

## 2025-03-03 ASSESSMENT — VISUAL ACUITY
OS_BCVA: 20/20-1
OD_BCVA: 20/20-2

## 2025-04-07 ENCOUNTER — DOCTOR'S OFFICE (OUTPATIENT)
Dept: URBAN - NONMETROPOLITAN AREA CLINIC 1 | Facility: CLINIC | Age: 70
Setting detail: OPHTHALMOLOGY
End: 2025-04-07
Payer: COMMERCIAL

## 2025-04-07 DIAGNOSIS — H34.8310: ICD-10-CM

## 2025-04-07 DIAGNOSIS — H43.393: ICD-10-CM

## 2025-04-07 DIAGNOSIS — H35.81: ICD-10-CM

## 2025-04-07 DIAGNOSIS — H43.813: ICD-10-CM

## 2025-04-07 PROCEDURE — 92134 CPTRZ OPH DX IMG PST SGM RTA: CPT | Performed by: OPHTHALMOLOGY

## 2025-04-07 PROCEDURE — 99214 OFFICE O/P EST MOD 30 MIN: CPT | Performed by: OPHTHALMOLOGY

## 2025-04-07 ASSESSMENT — REFRACTION_AUTOREFRACTION
OD_AXIS: 105
OD_CYLINDER: -1.00
OS_AXIS: 074
OS_CYLINDER: -0.75
OS_SPHERE: +0.50
OD_SPHERE: +0.25

## 2025-04-07 ASSESSMENT — CONFRONTATIONAL VISUAL FIELD TEST (CVF)
OS_FINDINGS: FULL
OD_FINDINGS: FULL

## 2025-04-07 ASSESSMENT — TEAR BREAK UP TIME (TBUT)
OS_TBUT: T
OD_TBUT: T

## 2025-04-07 ASSESSMENT — LID EXAM ASSESSMENTS
OS_BLEPHARITIS: T
OD_BLEPHARITIS: T

## 2025-04-07 ASSESSMENT — SUPERFICIAL PUNCTATE KERATITIS (SPK)
OS_SPK: T 1+
OD_SPK: T 1+

## 2025-04-07 ASSESSMENT — VISUAL ACUITY
OD_BCVA: 20/25
OS_BCVA: 20/20-2

## 2025-05-19 ENCOUNTER — DOCTOR'S OFFICE (OUTPATIENT)
Dept: URBAN - NONMETROPOLITAN AREA CLINIC 1 | Facility: CLINIC | Age: 70
Setting detail: OPHTHALMOLOGY
End: 2025-05-19
Payer: COMMERCIAL

## 2025-05-19 DIAGNOSIS — H43.813: ICD-10-CM

## 2025-05-19 DIAGNOSIS — H35.81: ICD-10-CM

## 2025-05-19 DIAGNOSIS — H34.8310: ICD-10-CM

## 2025-05-19 DIAGNOSIS — H43.393: ICD-10-CM

## 2025-05-19 PROCEDURE — 92134 CPTRZ OPH DX IMG PST SGM RTA: CPT | Performed by: OPHTHALMOLOGY

## 2025-05-19 PROCEDURE — 99214 OFFICE O/P EST MOD 30 MIN: CPT | Performed by: OPHTHALMOLOGY

## 2025-05-19 ASSESSMENT — REFRACTION_AUTOREFRACTION
OS_AXIS: 074
OS_SPHERE: +0.50
OD_SPHERE: +0.25
OS_CYLINDER: -0.75
OD_AXIS: 105
OD_CYLINDER: -1.00

## 2025-05-19 ASSESSMENT — SUPERFICIAL PUNCTATE KERATITIS (SPK)
OS_SPK: T 1+
OD_SPK: T 1+

## 2025-05-19 ASSESSMENT — VISUAL ACUITY
OS_BCVA: 20/20-1
OD_BCVA: 20/20-2

## 2025-05-19 ASSESSMENT — CONFRONTATIONAL VISUAL FIELD TEST (CVF)
OS_FINDINGS: FULL
OD_FINDINGS: FULL

## 2025-05-19 ASSESSMENT — LID EXAM ASSESSMENTS
OS_BLEPHARITIS: T
OD_BLEPHARITIS: T

## 2025-05-19 ASSESSMENT — TEAR BREAK UP TIME (TBUT)
OS_TBUT: T
OD_TBUT: T

## 2025-06-02 ENCOUNTER — DOCTOR'S OFFICE (OUTPATIENT)
Dept: URBAN - NONMETROPOLITAN AREA CLINIC 1 | Facility: CLINIC | Age: 70
Setting detail: OPHTHALMOLOGY
End: 2025-06-02
Payer: COMMERCIAL

## 2025-06-02 DIAGNOSIS — H34.8310: ICD-10-CM

## 2025-06-02 PROBLEM — H01.001 BLEPHARITIS; RIGHT UPPER LID, RIGHT LOWER LID, LEFT UPPER LID, LEFT LOWER LID: Status: ACTIVE | Noted: 2025-05-19

## 2025-06-02 PROBLEM — H01.004 BLEPHARITIS; RIGHT UPPER LID, RIGHT LOWER LID, LEFT UPPER LID, LEFT LOWER LID: Status: ACTIVE | Noted: 2025-05-19

## 2025-06-02 PROBLEM — H01.005 BLEPHARITIS; RIGHT UPPER LID, RIGHT LOWER LID, LEFT UPPER LID, LEFT LOWER LID: Status: ACTIVE | Noted: 2025-05-19

## 2025-06-02 PROBLEM — H01.002 BLEPHARITIS; RIGHT UPPER LID, RIGHT LOWER LID, LEFT UPPER LID, LEFT LOWER LID: Status: ACTIVE | Noted: 2025-05-19

## 2025-06-02 PROCEDURE — 67028 INJECTION EYE DRUG: CPT | Mod: RT | Performed by: OPHTHALMOLOGY

## 2025-06-06 ASSESSMENT — REFRACTION_AUTOREFRACTION
OD_CYLINDER: -1.00
OD_SPHERE: +0.25
OS_CYLINDER: -0.75
OS_SPHERE: +0.50
OD_AXIS: 105
OS_AXIS: 074

## 2025-06-06 ASSESSMENT — VISUAL ACUITY
OS_BCVA: 20/25
OD_BCVA: 20/20-2

## 2025-06-19 DIAGNOSIS — M10.9 GOUT, UNSPECIFIED CAUSE, UNSPECIFIED CHRONICITY, UNSPECIFIED SITE: ICD-10-CM

## 2025-06-19 RX ORDER — ALLOPURINOL 100 MG/1
100 TABLET ORAL DAILY
Qty: 90 TABLET | Refills: 1 | Status: SHIPPED | OUTPATIENT
Start: 2025-06-19

## 2025-07-14 ENCOUNTER — DOCTOR'S OFFICE (OUTPATIENT)
Dept: URBAN - NONMETROPOLITAN AREA CLINIC 1 | Facility: CLINIC | Age: 70
Setting detail: OPHTHALMOLOGY
End: 2025-07-14
Payer: COMMERCIAL

## 2025-07-14 DIAGNOSIS — H35.81: ICD-10-CM

## 2025-07-14 DIAGNOSIS — H43.813: ICD-10-CM

## 2025-07-14 DIAGNOSIS — H34.8310: ICD-10-CM

## 2025-07-14 DIAGNOSIS — H43.393: ICD-10-CM

## 2025-07-14 PROCEDURE — 92134 CPTRZ OPH DX IMG PST SGM RTA: CPT | Performed by: OPHTHALMOLOGY

## 2025-07-14 PROCEDURE — 99213 OFFICE O/P EST LOW 20 MIN: CPT | Performed by: OPHTHALMOLOGY

## 2025-07-14 ASSESSMENT — VISUAL ACUITY
OD_BCVA: 20/30+2
OS_BCVA: 20/20-2

## 2025-07-14 ASSESSMENT — REFRACTION_AUTOREFRACTION
OS_AXIS: 074
OD_CYLINDER: -1.00
OD_AXIS: 105
OS_SPHERE: +0.50
OD_SPHERE: +0.25
OS_CYLINDER: -0.75

## 2025-07-14 ASSESSMENT — LID EXAM ASSESSMENTS
OS_BLEPHARITIS: T
OD_BLEPHARITIS: T

## 2025-07-14 ASSESSMENT — TEAR BREAK UP TIME (TBUT)
OS_TBUT: T
OD_TBUT: T

## 2025-07-14 ASSESSMENT — SUPERFICIAL PUNCTATE KERATITIS (SPK)
OD_SPK: T 1+
OS_SPK: T 1+

## 2025-07-14 ASSESSMENT — CONFRONTATIONAL VISUAL FIELD TEST (CVF)
OS_FINDINGS: FULL
OD_FINDINGS: FULL

## 2025-08-04 ENCOUNTER — DOCTOR'S OFFICE (OUTPATIENT)
Dept: URBAN - NONMETROPOLITAN AREA CLINIC 1 | Facility: CLINIC | Age: 70
Setting detail: OPHTHALMOLOGY
End: 2025-08-04
Payer: COMMERCIAL

## 2025-08-04 DIAGNOSIS — H34.8310: ICD-10-CM

## 2025-08-04 DIAGNOSIS — H35.81: ICD-10-CM

## 2025-08-04 PROCEDURE — 92235 FLUORESCEIN ANGRPH MLTIFRAME: CPT | Performed by: OPHTHALMOLOGY

## 2025-08-04 PROCEDURE — 99213 OFFICE O/P EST LOW 20 MIN: CPT | Performed by: OPHTHALMOLOGY

## 2025-08-04 ASSESSMENT — SUPERFICIAL PUNCTATE KERATITIS (SPK)
OD_SPK: T 1+
OS_SPK: T 1+

## 2025-08-04 ASSESSMENT — REFRACTION_AUTOREFRACTION
OS_CYLINDER: -0.75
OD_SPHERE: +0.25
OD_AXIS: 105
OS_SPHERE: +0.50
OD_CYLINDER: -1.00
OS_AXIS: 074

## 2025-08-04 ASSESSMENT — TEAR BREAK UP TIME (TBUT)
OS_TBUT: T
OD_TBUT: T

## 2025-08-04 ASSESSMENT — LID EXAM ASSESSMENTS
OS_BLEPHARITIS: T
OD_BLEPHARITIS: T

## 2025-08-04 ASSESSMENT — CONFRONTATIONAL VISUAL FIELD TEST (CVF)
OS_FINDINGS: FULL
OD_FINDINGS: FULL

## 2025-08-04 ASSESSMENT — VISUAL ACUITY
OD_BCVA: 20/25-2
OS_BCVA: 20/20

## 2025-08-11 ENCOUNTER — DOCTOR'S OFFICE (OUTPATIENT)
Dept: URBAN - NONMETROPOLITAN AREA CLINIC 1 | Facility: CLINIC | Age: 70
Setting detail: OPHTHALMOLOGY
End: 2025-08-11
Payer: COMMERCIAL

## 2025-08-11 DIAGNOSIS — H34.8310: ICD-10-CM

## 2025-08-11 PROCEDURE — 67028 INJECTION EYE DRUG: CPT | Mod: RT | Performed by: OPHTHALMOLOGY

## 2025-08-11 ASSESSMENT — REFRACTION_AUTOREFRACTION
OD_AXIS: 105
OS_AXIS: 074
OS_CYLINDER: -0.75
OD_SPHERE: +0.25
OS_SPHERE: +0.50
OD_CYLINDER: -1.00

## 2025-08-11 ASSESSMENT — VISUAL ACUITY
OS_BCVA: 20/20-2
OD_BCVA: 20/25-2